# Patient Record
Sex: FEMALE | Race: OTHER | Employment: UNEMPLOYED | URBAN - METROPOLITAN AREA
[De-identification: names, ages, dates, MRNs, and addresses within clinical notes are randomized per-mention and may not be internally consistent; named-entity substitution may affect disease eponyms.]

---

## 2019-10-18 ENCOUNTER — HOSPITAL ENCOUNTER (OUTPATIENT)
Dept: LAB | Age: 29
Discharge: HOME | End: 2019-10-18
Payer: SUBSIDIZED

## 2019-10-18 ENCOUNTER — INITIAL PRENATAL (OUTPATIENT)
Dept: FAMILY MEDICINE CLINIC | Age: 29
End: 2019-10-18

## 2019-10-18 VITALS
WEIGHT: 158 LBS | TEMPERATURE: 97.4 F | DIASTOLIC BLOOD PRESSURE: 67 MMHG | SYSTOLIC BLOOD PRESSURE: 104 MMHG | HEART RATE: 72 BPM | RESPIRATION RATE: 16 BRPM | BODY MASS INDEX: 28 KG/M2 | HEIGHT: 63 IN | OXYGEN SATURATION: 99 %

## 2019-10-18 DIAGNOSIS — Z34.90 PREGNANCY, UNSPECIFIED GESTATIONAL AGE: Primary | ICD-10-CM

## 2019-10-18 LAB
BILIRUB UR QL STRIP: NEGATIVE
GLUCOSE UR-MCNC: NEGATIVE MG/DL
KETONES P FAST UR STRIP-MCNC: NEGATIVE MG/DL
PH UR STRIP: 5.5 [PH] (ref 4.6–8)
PROT UR QL STRIP: NEGATIVE
SP GR UR STRIP: 1.01 (ref 1–1.03)
UA UROBILINOGEN AMB POC: NORMAL (ref 0.2–1)
URINALYSIS CLARITY POC: CLEAR
URINALYSIS COLOR POC: YELLOW
URINE BLOOD POC: NORMAL
URINE LEUKOCYTES POC: NEGATIVE
URINE NITRITES POC: NEGATIVE

## 2019-10-18 PROCEDURE — 88175 CYTOPATH C/V AUTO FLUID REDO: CPT

## 2019-10-18 PROCEDURE — 87491 CHLMYD TRACH DNA AMP PROBE: CPT

## 2019-10-18 RX ORDER — PRENATAL VIT 96/IRON FUM/FOLIC 27MG-0.8MG
TABLET ORAL
Refills: 0 | COMMUNITY
Start: 2019-10-05 | End: 2021-02-27

## 2019-10-18 NOTE — PROGRESS NOTES
Chief Complaint   Patient presents with    Initial Prenatal Visit     10w5d        Leakage of Fluid: NO  Vaginal Bleeding: NO  Fetal Movement: Not yet  Prenatal vitamins: YES  Having Contractions: NO  Pain: NO    .  Visit Vitals  /67 (BP 1 Location: Left arm, BP Patient Position: Sitting)   Pulse 72   Temp 97.4 °F (36.3 °C) (Oral)   Resp 16   Ht 5' 2.6\" (1.59 m)   Wt 158 lb (71.7 kg)   LMP 2019 (Exact Date)   SpO2 99%   BMI 28.35 kg/m²

## 2019-10-18 NOTE — LETTER
NOTIFICATION 
 
10/18/2019 3:53 PM 
 
[facility]as [address] [facility] [id number] To Whom It May Concern: 
 
Mariano Lamas is currently under the care of [address]. 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 Sincerely, Shawna Andrew, DO

## 2019-10-18 NOTE — PROGRESS NOTES
History and Physical    Patient: Maria D Decker MRN: <[id number]>  SSN: [id number]    YOB: 1990  Age: 34 y.o. Sex: female      Subjective:      Maria D Decker is a 34 y.o. female G1 at 7w10d who presents for IOB visit. Not trying to get pregnant but happy about it. Was not on birth control. FOB involved, do not live together, [place] Pt lives with a friend. Does not work. Seen at [facility] for abd pain, dx with gallstones, has eliminated fat from diet and now without pain. Past Medical History:   Diagnosis Date    Depression     never on Rx, was depressed when her mother  9 yr ago    Gallstones      History reviewed. No pertinent surgical history. History reviewed. No pertinent family history. Social History     Tobacco Use    Smoking status: Never Smoker    Smokeless tobacco: Never Used   Substance Use Topics    Alcohol use: Not Currently      Prior to Admission medications    Medication Sig Start Date End Date Taking? Authorizing Provider   prenatal [id number] fum/folic (PRENATAL VITAMIN) 27 mg iron- 800 mcg tab tablet TK 1 T PO  D 10/5/19   Provider, Historical        No Known Allergies    Review of Systems:  ROS negative except as noted in HPI. Objective:     Vitals:    10/18/19 1526   BP: 104/67   Pulse: 72   Resp: 16   Temp: 97.4 °F (36.3 °C)   TempSrc: Oral   SpO2: 99%   Weight: 158 lb (71.7 kg)   Height: 5' 2.6\" (1.59 m)        Physical Exam:  See prenatal physical exam.    Assessment/Plan:   30yo G1 @ 10w5d by LMP=7wk scan at [facility] (scanned in media)  1. IUP: IOB labs today, s/p flu, PNV  2. Gallstones: on US at [facility] on , scanned in med, asymptomatic   3.   Hx depression: never on meds, no depression currently, would see for early PP depression screening     Signed By: Reyes Ackerman DO     2019

## 2019-10-20 LAB — BACTERIA UR CULT: NO GROWTH

## 2019-10-22 LAB
ABO GROUP BLD: NORMAL
BASOPHILS # BLD AUTO: 0 X10E3/UL (ref 0–0.2)
BASOPHILS NFR BLD AUTO: 0 %
BLD GP AB SCN SERPL QL: NEGATIVE
C TRACH RRNA SPEC QL NAA+PROBE: NEGATIVE
EOSINOPHIL # BLD AUTO: 0.1 X10E3/UL (ref 0–0.4)
EOSINOPHIL NFR BLD AUTO: 1 %
ERYTHROCYTE [DISTWIDTH] IN BLOOD BY AUTOMATED COUNT: 12.7 % (ref 12.3–15.4)
EST. AVERAGE GLUCOSE BLD GHB EST-MCNC: 91 MG/DL
HBA1C MFR BLD: 4.8 % (ref 4.8–5.6)
HBV SURFACE AG SERPL QL IA: NEGATIVE
HCT VFR BLD AUTO: 39.9 % (ref 34–46.6)
HGB A MFR BLD: 97.6 % (ref 96.4–98.8)
HGB A2 MFR BLD COLUMN CHROM: 2.4 % (ref 1.8–3.2)
HGB BLD-MCNC: 13.6 G/DL (ref 11.1–15.9)
HGB C MFR BLD: 0 %
HGB F MFR BLD: 0 % (ref 0–2)
HGB FRACT BLD-IMP: NORMAL
HGB OTHER MFR BLD HPLC: 0 %
HGB S BLD QL SOLY: NEGATIVE
HGB S MFR BLD: 0 %
HIV 1+2 AB+HIV1 P24 AG SERPL QL IA: NON REACTIVE
IMM GRANULOCYTES # BLD AUTO: 0 X10E3/UL (ref 0–0.1)
IMM GRANULOCYTES NFR BLD AUTO: 0 %
LYMPHOCYTES # BLD AUTO: 2.4 X10E3/UL (ref 0.7–3.1)
LYMPHOCYTES NFR BLD AUTO: 25 %
MCH RBC QN AUTO: 31.6 PG (ref 26.6–33)
MCHC RBC AUTO-ENTMCNC: 34.1 G/DL (ref 31.5–35.7)
MCV RBC AUTO: 93 FL (ref 79–97)
MONOCYTES # BLD AUTO: 0.6 X10E3/UL (ref 0.1–0.9)
MONOCYTES NFR BLD AUTO: 7 %
N GONORRHOEA RRNA SPEC QL NAA+PROBE: NEGATIVE
NEUTROPHILS # BLD AUTO: 6.5 X10E3/UL (ref 1.4–7)
NEUTROPHILS NFR BLD AUTO: 67 %
PLATELET # BLD AUTO: 150 X10E3/UL (ref 150–450)
RBC # BLD AUTO: 4.3 X10E6/UL (ref 3.77–5.28)
RH BLD: POSITIVE
RPR SER QL: NON REACTIVE
RUBV IGG SERPL IA-ACNC: <0.9 INDEX
SPECIMEN SOURCE: NORMAL
VZV IGG SER IA-ACNC: 864 INDEX
WBC # BLD AUTO: 9.6 X10E3/UL (ref 3.4–10.8)

## 2019-11-15 ENCOUNTER — ROUTINE PRENATAL (OUTPATIENT)
Dept: FAMILY MEDICINE CLINIC | Age: 29
End: 2019-11-15

## 2019-11-15 VITALS
HEIGHT: 63 IN | OXYGEN SATURATION: 99 % | BODY MASS INDEX: 29.41 KG/M2 | SYSTOLIC BLOOD PRESSURE: 112 MMHG | WEIGHT: 166 LBS | RESPIRATION RATE: 18 BRPM | TEMPERATURE: 97.8 F | DIASTOLIC BLOOD PRESSURE: 72 MMHG | HEART RATE: 73 BPM

## 2019-11-15 DIAGNOSIS — Z34.90 PRENATAL CARE, ANTEPARTUM: Primary | ICD-10-CM

## 2019-11-15 LAB
BILIRUB UR QL STRIP: NEGATIVE
GLUCOSE UR-MCNC: NEGATIVE MG/DL
KETONES P FAST UR STRIP-MCNC: NORMAL MG/DL
PH UR STRIP: 5.5 [PH] (ref 4.6–8)
PROT UR QL STRIP: NEGATIVE
SP GR UR STRIP: 1.03 (ref 1–1.03)
UA UROBILINOGEN AMB POC: NORMAL (ref 0.2–1)
URINALYSIS CLARITY POC: CLEAR
URINALYSIS COLOR POC: YELLOW
URINE BLOOD POC: NORMAL
URINE LEUKOCYTES POC: NORMAL
URINE NITRITES POC: NEGATIVE

## 2019-11-15 NOTE — PROGRESS NOTES
Chief Complaint   Patient presents with    Routine Prenatal Visit     14w 5 d     1. Have you been to the ER, urgent care clinic since your last visit? Hospitalized since your last visit? No    2. Have you seen or consulted any other health care providers outside of the [address] [place] [address] since your last visit? Include any pap smears or colon screening.  No          No abnormal bleeding or discharge

## 2019-11-15 NOTE — PROGRESS NOTES
Return OB Visit       Objective:   /72 (BP 1 Location: Right arm, BP Patient Position: Sitting)   Pulse 73   Temp 97.8 °F (36.6 °C) (Oral)   Resp 18   Ht 5' 2.6\" (1.59 m)   Wt 166 lb (75.3 kg)   LMP 08/04/2019 (Exact Date)   SpO2 99%   BMI 29.78 kg/m²     See flowsheet   Assessment       ICD-10-CM ICD-9-CM    1. Prenatal care, antepartum Z34.90 V22.1 AMB POC URINALYSIS DIP STICK AUTO W/O MICRO     Plan   30yo G1 @ 14w5d by LMP=7wk scan at [facility] (scanned in media)  1. IUP: RH pos, s/p flu, PNV, anatomy 12/23  2. Gallstones: on US at [facility] on 9/21, scanned in med, asymptomatic   3. Hx depression: never on meds, no depression currently, would see for early PP depression screening   4.   Rubella NI: offer vaccine PP     Orders Placed This Encounter    AMB POC URINALYSIS DIP STICK AUTO W/O MICRO         Shawna Andrew,

## 2019-12-13 ENCOUNTER — ROUTINE PRENATAL (OUTPATIENT)
Dept: FAMILY MEDICINE CLINIC | Age: 29
End: 2019-12-13

## 2019-12-13 VITALS
RESPIRATION RATE: 16 BRPM | BODY MASS INDEX: 30.65 KG/M2 | HEART RATE: 72 BPM | WEIGHT: 173 LBS | OXYGEN SATURATION: 98 % | SYSTOLIC BLOOD PRESSURE: 100 MMHG | TEMPERATURE: 97.5 F | HEIGHT: 63 IN | DIASTOLIC BLOOD PRESSURE: 64 MMHG

## 2019-12-13 DIAGNOSIS — Z34.90 PRENATAL CARE, ANTEPARTUM: Primary | ICD-10-CM

## 2019-12-13 LAB
BILIRUB UR QL STRIP: NEGATIVE
GLUCOSE UR-MCNC: NEGATIVE MG/DL
KETONES P FAST UR STRIP-MCNC: NEGATIVE MG/DL
PH UR STRIP: 6 [PH] (ref 4.6–8)
PROT UR QL STRIP: NEGATIVE
SP GR UR STRIP: 1.02 (ref 1–1.03)
UA UROBILINOGEN AMB POC: NORMAL (ref 0.2–1)
URINALYSIS CLARITY POC: CLEAR
URINALYSIS COLOR POC: YELLOW
URINE BLOOD POC: NEGATIVE
URINE LEUKOCYTES POC: NORMAL
URINE NITRITES POC: NEGATIVE

## 2019-12-13 NOTE — PROGRESS NOTES
Chief Complaint   Patient presents with    Routine Prenatal Visit     18w5d    Leakage of Fluid: NO  Vaginal Bleeding: NO  Fetal Movement: YES  Prenatal vitamins: YES  Having Contractions: NO  Pain: NO    Visit Vitals  Resp 16   Ht 5' 2.6\" (1.59 m)   Wt 173 lb (78.5 kg)   LMP 08/04/2019 (Exact Date)   BMI 31.04 kg/m²

## 2019-12-13 NOTE — PROGRESS NOTES
Return OB Visit   No concerns     Objective:   Resp 16   Ht 5' 2.6\" (1.59 m)   Wt 173 lb (78.5 kg)   LMP 08/04/2019 (Exact Date)   BMI 31.04 kg/m²     See flowsheet   Assessment       ICD-10-CM ICD-9-CM    1. Prenatal care, antepartum Z34.90 V22.1 AMB POC URINALYSIS DIP STICK AUTO W/O MICRO      CANCELED: CULTURE, GENITAL GROUP B STREP     Plan   30yo G1 @ 18w5d by LMP=7wk scan at [facility] (scanned in media)  1. IUP: RH pos, s/p flu, PNV, anatomy 12/23  2. Gallstones: on US at [facility] on 9/21, scanned in med, asymptomatic   3. Hx depression: never on meds, no depression currently, would see for early PP depression screening   4.   Rubella NI: offer vaccine PP     Orders Placed This Encounter    AMB POC URINALYSIS DIP STICK AUTO W/O MICRO         Shawna Andrew, DO

## 2019-12-23 ENCOUNTER — HOSPITAL ENCOUNTER (OUTPATIENT)
Dept: PERINATAL CARE | Age: 29
Discharge: HOME | End: 2019-12-23
Attending: OBSTETRICS & GYNECOLOGY
Payer: SUBSIDIZED

## 2019-12-23 PROCEDURE — 76805 OB US >/= 14 WKS SNGL FETUS: CPT | Performed by: OBSTETRICS & GYNECOLOGY

## 2020-01-10 ENCOUNTER — ROUTINE PRENATAL (OUTPATIENT)
Dept: FAMILY MEDICINE CLINIC | Age: 30
End: 2020-01-10

## 2020-01-10 VITALS
HEART RATE: 62 BPM | RESPIRATION RATE: 16 BRPM | OXYGEN SATURATION: 98 % | BODY MASS INDEX: 31.71 KG/M2 | WEIGHT: 179 LBS | DIASTOLIC BLOOD PRESSURE: 61 MMHG | TEMPERATURE: 97.9 F | HEIGHT: 63 IN | SYSTOLIC BLOOD PRESSURE: 95 MMHG

## 2020-01-10 DIAGNOSIS — Z34.90 PRENATAL CARE, ANTEPARTUM: Primary | ICD-10-CM

## 2020-01-10 LAB
BILIRUB UR QL STRIP: NEGATIVE
GLUCOSE UR-MCNC: NEGATIVE MG/DL
KETONES P FAST UR STRIP-MCNC: NEGATIVE MG/DL
PH UR STRIP: 6.5 [PH] (ref 4.6–8)
PROT UR QL STRIP: NEGATIVE
SP GR UR STRIP: 1.01 (ref 1–1.03)
UA UROBILINOGEN AMB POC: NORMAL (ref 0.2–1)
URINALYSIS CLARITY POC: CLEAR
URINALYSIS COLOR POC: YELLOW
URINE BLOOD POC: NEGATIVE
URINE LEUKOCYTES POC: NEGATIVE
URINE NITRITES POC: NEGATIVE

## 2020-01-10 NOTE — PROGRESS NOTES
Return OB Visit   No concerns     Objective:   BP 95/61 (BP 1 Location: Left arm, BP Patient Position: Sitting)   Pulse 62   Temp 97.9 °F (36.6 °C) (Oral)   Resp 16   Ht 5' 2.6\" (1.59 m)   Wt 179 lb (81.2 kg)   LMP 08/04/2019 (Exact Date)   SpO2 98%   BMI 32.11 kg/m²     See flowsheet   Assessment       ICD-10-CM ICD-9-CM    1. Prenatal care, antepartum Z34.90 V22.1 AMB POC URINALYSIS DIP STICK AUTO W/ MICRO     Plan   28yo G1 @ 22w5d by LMP=7wk scan at [facility] (scanned in media)  1. IUP: RH pos, s/p flu, PNV, anatomy normal   2. Gallstones: on US at [facility] on 9/21, scanned in media, asymptomatic   3. Hx depression: never on meds, no depression currently, would see for early PP depression screening   4.   Rubella NI: offer vaccine PP     Orders Placed This Encounter    AMB POC URINALYSIS DIP STICK AUTO W/ MICRO         Shawna Andrew,

## 2020-01-10 NOTE — PROGRESS NOTES
Chief Complaint   Patient presents with    Routine Prenatal Visit     22w5d    Leakage of Fluid: NO  Vaginal Bleeding: NO  Fetal Movement: YES  Prenatal vitamins: YES  Having Contractions: NO  Pain: NO    Visit Vitals  BP 95/61 (BP 1 Location: Left arm, BP Patient Position: Sitting)   Pulse 62   Temp 97.9 °F (36.6 °C) (Oral)   Resp 16   Ht 5' 2.6\" (1.59 m)   Wt 179 lb (81.2 kg)   LMP 08/04/2019 (Exact Date)   SpO2 98%   BMI 32.11 kg/m²

## 2020-02-07 ENCOUNTER — HOSPITAL ENCOUNTER (OUTPATIENT)
Dept: LAB | Age: 30
Discharge: HOME | End: 2020-02-07

## 2020-02-07 ENCOUNTER — ROUTINE PRENATAL (OUTPATIENT)
Dept: FAMILY MEDICINE CLINIC | Age: 30
End: 2020-02-07

## 2020-02-07 VITALS
SYSTOLIC BLOOD PRESSURE: 97 MMHG | BODY MASS INDEX: 33.13 KG/M2 | HEIGHT: 63 IN | DIASTOLIC BLOOD PRESSURE: 60 MMHG | HEART RATE: 74 BPM | RESPIRATION RATE: 14 BRPM | OXYGEN SATURATION: 97 % | WEIGHT: 187 LBS | TEMPERATURE: 97.7 F

## 2020-02-07 DIAGNOSIS — Z3A.26 26 WEEKS GESTATION OF PREGNANCY: ICD-10-CM

## 2020-02-07 DIAGNOSIS — Z3A.26 26 WEEKS GESTATION OF PREGNANCY: Primary | ICD-10-CM

## 2020-02-07 LAB
BILIRUB UR QL STRIP: NEGATIVE
ERYTHROCYTE [DISTWIDTH] IN BLOOD BY AUTOMATED COUNT: 12.5 % (ref 11.5–14.5)
GLUCOSE 1H P 100 G GLC PO SERPL-MCNC: 157 MG/DL (ref 65–140)
GLUCOSE UR-MCNC: NEGATIVE MG/DL
HCT VFR BLD AUTO: 36.1 % (ref 35–47)
HGB BLD-MCNC: 12.1 G/DL (ref 11.5–16)
KETONES P FAST UR STRIP-MCNC: NEGATIVE MG/DL
MCH RBC QN AUTO: 31.3 PG (ref 26–34)
MCHC RBC AUTO-ENTMCNC: 33.5 G/DL (ref 30–36.5)
MCV RBC AUTO: 93.3 FL (ref 80–99)
NRBC # BLD: 0 K/UL (ref 0–0.01)
NRBC BLD-RTO: 0 PER 100 WBC
PH UR STRIP: 7 [PH] (ref 4.6–8)
PLATELET # BLD AUTO: 127 K/UL (ref 150–400)
PROT UR QL STRIP: NEGATIVE
RBC # BLD AUTO: 3.87 M/UL (ref 3.8–5.2)
SP GR UR STRIP: 1.02 (ref 1–1.03)
UA UROBILINOGEN AMB POC: NORMAL (ref 0.2–1)
URINALYSIS CLARITY POC: CLEAR
URINALYSIS COLOR POC: YELLOW
URINE BLOOD POC: NEGATIVE
URINE LEUKOCYTES POC: NORMAL
URINE NITRITES POC: NEGATIVE
WBC # BLD AUTO: 9.2 K/UL (ref 3.6–11)

## 2020-02-07 NOTE — PROGRESS NOTES
Return OB Visit   No concerns. Baby less active at night than during the day but still moves at night. Objective:   BP 97/60   Pulse 74   Temp 97.7 °F (36.5 °C) (Oral)   Resp 14   Ht 5' 2.6\" (1.59 m)   Wt 187 lb (84.8 kg)   LMP 08/04/2019 (Exact Date)   SpO2 97%   BMI 33.55 kg/m²     See flowsheet   Assessment       ICD-10-CM ICD-9-CM    1. 26 weeks gestation of pregnancy Z3A.26 V22.2 GLUCOSE, GESTATIONAL 1 HR TOLERANCE      CBC W/O DIFF      AMB POC URINALYSIS DIP STICK AUTO W/O MICRO     Plan   28yo G1 @ 26w5d by LMP=7wk scan at [facility] (scanned in media)  1. IUP: RH pos, s/p flu, PNV, anatomy normal, CBC+GTT today. Kick counts discussed. 2.  Gallstones: on US at [facility] on 9/21, scanned in media, asymptomatic   3. Hx depression: never on meds, no depression currently, would see for early PP depression screening   4.   Rubella NI: offer vaccine PP     Orders Placed This Encounter    GLUCOSE, GESTATIONAL 1 HR TOLERANCE     Standing Status:   Future     Standing Expiration Date:   2/7/2021    CBC W/O DIFF     Standing Status:   Future     Standing Expiration Date:   2/7/2021    AMB POC URINALYSIS DIP STICK AUTO W/O MICRO         Shawna Andrew DO

## 2020-02-07 NOTE — PROGRESS NOTES
Chief Complaint   Patient presents with    Routine Prenatal Visit     26w5d    Leakage of Fluid: NO  Vaginal Bleeding: NO  Fetal Movement: YES  Prenatal vitamins: YES  Having Contractions: NO  Pain: NO    Visit Vitals  Ht 5' 2.6\" (1.59 m)   LMP 08/04/2019 (Exact Date)   BMI 32.11 kg/m²

## 2020-02-11 ENCOUNTER — TELEPHONE (OUTPATIENT)
Dept: FAMILY MEDICINE CLINIC | Age: 30
End: 2020-02-11

## 2020-02-11 NOTE — TELEPHONE ENCOUNTER
Called patient using [employer]  and left voicemail for patient to call back. Patient needs to return for 3 hour fasting GTT.

## 2020-02-28 ENCOUNTER — ROUTINE PRENATAL (OUTPATIENT)
Dept: FAMILY MEDICINE CLINIC | Age: 30
End: 2020-02-28

## 2020-02-28 VITALS
HEIGHT: 63 IN | TEMPERATURE: 97.6 F | DIASTOLIC BLOOD PRESSURE: 69 MMHG | RESPIRATION RATE: 16 BRPM | SYSTOLIC BLOOD PRESSURE: 104 MMHG | OXYGEN SATURATION: 98 % | HEART RATE: 82 BPM | BODY MASS INDEX: 33.55 KG/M2

## 2020-02-28 DIAGNOSIS — O09.899 RUBELLA NON-IMMUNE STATUS, ANTEPARTUM: ICD-10-CM

## 2020-02-28 DIAGNOSIS — Z28.39 RUBELLA NON-IMMUNE STATUS, ANTEPARTUM: ICD-10-CM

## 2020-02-28 DIAGNOSIS — D69.6 THROMBOCYTOPENIA AFFECTING PREGNANCY (HCC): ICD-10-CM

## 2020-02-28 DIAGNOSIS — Z34.90 PRENATAL CARE, ANTEPARTUM: Primary | ICD-10-CM

## 2020-02-28 DIAGNOSIS — K80.20 GALLSTONES: ICD-10-CM

## 2020-02-28 DIAGNOSIS — O99.119 THROMBOCYTOPENIA AFFECTING PREGNANCY (HCC): ICD-10-CM

## 2020-02-28 DIAGNOSIS — Z86.59 HISTORY OF DEPRESSION: ICD-10-CM

## 2020-02-28 DIAGNOSIS — O99.210 OBESITY IN PREGNANCY: ICD-10-CM

## 2020-02-28 NOTE — PROGRESS NOTES
Return OB Visit   No concerns, baby active. Objective:   /69 (BP 1 Location: Left arm, BP Patient Position: Sitting)   Pulse 82   Temp 97.6 °F (36.4 °C) (Oral)   Resp 16   Ht 5' 2.6\" (1.59 m)   LMP 08/04/2019 (Exact Date)   SpO2 98%   BMI 33.55 kg/m²     See flowsheet   Assessment       ICD-10-CM ICD-9-CM    1. Prenatal care, antepartum Z34.90 V22.1 AMB POC URINALYSIS DIP STICK AUTO W/O MICRO      TETANUS, DIPHTHERIA TOXOIDS AND ACELLULAR PERTUSSIS VACCINE (TDAP), IN INDIVIDS. >=7, IM      AK IMMUNIZ ADMIN,1 SINGLE/COMB VAC/TOXOID      PLATELET COUNT      GLUCOSE, GESTATIONAL, 3 HR TOLERANCE   2. Obesity in pregnancy O99.210 649.10    3. Gallstones K80.20 574.20    4. History of depression Z86.59 V11.8    5. Rubella non-immune status, antepartum O99.89 646.83     Z28.3 V15.83    6. Thrombocytopenia affecting pregnancy ([facility] [address] O99.119 649.30     D69.6 287.5      Plan   28yo G1 @ 29w5d by LMP=7wk scan at [facility] (scanned in media)  1. IUP: RH pos, s/p flu, PNV, anatomy normal, failed 1' and couldn't get in touch to schedule 3' - is now scheduled for next week. S/p tdap. 2.  Gallstones: on US at [facility] on 9/21, scanned in media, asymptomatic   3. Hx depression: never on meds, no depression currently, would see for early PP depression screening   4. Rubella NI: offer vaccine PP   5. Thrombocytopenia: mild, likely gestational.  Rechecking with 3'.     6.  Obesity    Orders Placed This Encounter    AK IMMUNIZ ADMIN,1 SINGLE/COMB VAC/TOXOID    TETANUS, DIPHTHERIA TOXOIDS AND ACELLULAR PERTUSSIS VACCINE (TDAP), IN INDIVIDS. >=7, IM    PLATELET COUNT     Standing Status:   Future     Standing Expiration Date:   2/28/2021    GLUCOSE, GESTATIONAL, 3 HR TOLERANCE     Standing Status:   Future     Standing Expiration Date:   2/28/2021    AMB POC URINALYSIS DIP STICK AUTO W/O MICRO         Marcee C Vest, DO

## 2020-03-03 ENCOUNTER — HOSPITAL ENCOUNTER (OUTPATIENT)
Dept: LAB | Age: 30
Discharge: HOME | End: 2020-03-03

## 2020-03-03 ENCOUNTER — LAB ONLY (OUTPATIENT)
Dept: FAMILY MEDICINE CLINIC | Age: 30
End: 2020-03-03

## 2020-03-03 DIAGNOSIS — Z34.90 PRENATAL CARE, ANTEPARTUM: ICD-10-CM

## 2020-03-03 LAB
COMMENT, HOLDF: NORMAL
GESTATIONAL 3HR GTT,GESTA: NORMAL
GLUCOSE 1H P 100 G GLC PO SERPL-MCNC: 118 MG/DL (ref 65–180)
GLUCOSE P FAST SERPL-MCNC: 76 MG/DL (ref 65–95)
GLUCOSE, 2 HR,GSTT2: 149 MG/DL (ref 65–155)
GLUCOSE, 3 HR,GSTT3: 117 MG/DL (ref 65–140)
PLATELET # BLD AUTO: NORMAL K/UL (ref 150–400)
SAMPLES BEING HELD,HOLD: NORMAL

## 2020-03-04 ENCOUNTER — TELEPHONE (OUTPATIENT)
Dept: FAMILY MEDICINE CLINIC | Age: 30
End: 2020-03-04

## 2020-03-04 DIAGNOSIS — O99.119 THROMBOCYTOPENIA AFFECTING PREGNANCY (HCC): Primary | ICD-10-CM

## 2020-03-04 DIAGNOSIS — D69.6 THROMBOCYTOPENIA AFFECTING PREGNANCY (HCC): Primary | ICD-10-CM

## 2020-03-04 NOTE — TELEPHONE ENCOUNTER
Left voicemail for patient with [employer]  [phone]. Patient needs lab appointment to recheck platelet count. Lab ordered.

## 2020-03-04 NOTE — TELEPHONE ENCOUNTER
Lab error for platelet count. Per verbal order from Dr. Ana Rosa Schwartz to order platelet count citrated plasma.

## 2020-03-13 ENCOUNTER — ROUTINE PRENATAL (OUTPATIENT)
Dept: FAMILY MEDICINE CLINIC | Age: 30
End: 2020-03-13

## 2020-03-13 ENCOUNTER — HOSPITAL ENCOUNTER (OUTPATIENT)
Dept: LAB | Age: 30
Discharge: HOME | End: 2020-03-13

## 2020-03-13 VITALS
OXYGEN SATURATION: 97 % | RESPIRATION RATE: 16 BRPM | HEIGHT: 63 IN | HEART RATE: 71 BPM | SYSTOLIC BLOOD PRESSURE: 102 MMHG | WEIGHT: 196 LBS | DIASTOLIC BLOOD PRESSURE: 63 MMHG | TEMPERATURE: 97.3 F | BODY MASS INDEX: 34.73 KG/M2

## 2020-03-13 DIAGNOSIS — O99.119 THROMBOCYTOPENIA AFFECTING PREGNANCY (HCC): ICD-10-CM

## 2020-03-13 DIAGNOSIS — Z86.59 HISTORY OF DEPRESSION: ICD-10-CM

## 2020-03-13 DIAGNOSIS — D69.6 THROMBOCYTOPENIA AFFECTING PREGNANCY (HCC): ICD-10-CM

## 2020-03-13 DIAGNOSIS — Z34.90 PRENATAL CARE, ANTEPARTUM: Primary | ICD-10-CM

## 2020-03-13 DIAGNOSIS — O99.210 OBESITY IN PREGNANCY: ICD-10-CM

## 2020-03-13 DIAGNOSIS — O09.899 RUBELLA NON-IMMUNE STATUS, ANTEPARTUM: ICD-10-CM

## 2020-03-13 DIAGNOSIS — Z28.39 RUBELLA NON-IMMUNE STATUS, ANTEPARTUM: ICD-10-CM

## 2020-03-13 DIAGNOSIS — K80.20 GALLSTONES: ICD-10-CM

## 2020-03-13 LAB
BILIRUB UR QL STRIP: NEGATIVE
GLUCOSE UR-MCNC: NEGATIVE MG/DL
KETONES P FAST UR STRIP-MCNC: NEGATIVE MG/DL
PH UR STRIP: 5.5 [PH] (ref 4.6–8)
PROT UR QL STRIP: NEGATIVE
SP GR UR STRIP: 1.02 (ref 1–1.03)
UA UROBILINOGEN AMB POC: NORMAL (ref 0.2–1)
URINALYSIS CLARITY POC: NORMAL
URINALYSIS COLOR POC: YELLOW
URINE BLOOD POC: NEGATIVE
URINE LEUKOCYTES POC: NEGATIVE
URINE NITRITES POC: NEGATIVE

## 2020-03-13 NOTE — PROGRESS NOTES
Return OB Visit   No concerns, baby active. Objective:   /63 (BP 1 Location: Left arm, BP Patient Position: Sitting)   Pulse 71   Temp 97.3 °F (36.3 °C) (Oral)   Resp 16   Ht 5' 2.6\" (1.59 m)   Wt 196 lb (88.9 kg)   LMP 08/04/2019 (Exact Date)   SpO2 97%   BMI 35.16 kg/m²     See flowsheet   Assessment       ICD-10-CM ICD-9-CM    1. Prenatal care, antepartum Z34.90 V22.1 AMB POC URINALYSIS DIP STICK AUTO W/O MICRO   2. Thrombocytopenia affecting pregnancy (HCC) O99.119 649.30     D69.6 287.5    3. Gallstones K80.20 574.20    4. Obesity in pregnancy O99.210 649.10    5. Rubella non-immune status, antepartum O99.89 646.83     Z28.3 V15.83    6. History of depression Z86.59 V11.8      Plan   30yo G1 @ 31w5d by LMP=7wk scan at [facility] (scanned in media)  1. IUP: RH pos, s/p flu, PNV, anatomy normal, passed 3' GTT, S/p tdap. 2.  Gallstones: on US at [facility] on 9/21, scanned in media, asymptomatic   3. Hx depression: never on meds, no depression currently, would see for early PP depression screening   4. Rubella NI: offer vaccine PP   5. Thrombocytopenia: mild, likely gestational.  Rechecking as there was an error on last recheck.     6.  Obesity: will plan to get growth scan ~ 34-36 wk     Orders Placed This Encounter    AMB POC URINALYSIS DIP STICK AUTO W/O MICRO         Shawna Andrew,

## 2020-03-14 LAB — PLATELET # BLD AUTO: 196 K/UL (ref 150–400)

## 2020-03-19 NOTE — PROGRESS NOTES
Platelets normal on citrated tube. Should collected in non-ETDA tube going forward for accurate measurement.

## 2020-03-23 ENCOUNTER — ROUTINE PRENATAL (OUTPATIENT)
Dept: FAMILY MEDICINE CLINIC | Age: 30
End: 2020-03-23

## 2020-03-23 VITALS
OXYGEN SATURATION: 97 % | WEIGHT: 199 LBS | HEIGHT: 63 IN | SYSTOLIC BLOOD PRESSURE: 107 MMHG | RESPIRATION RATE: 16 BRPM | DIASTOLIC BLOOD PRESSURE: 66 MMHG | HEART RATE: 78 BPM | BODY MASS INDEX: 35.26 KG/M2 | TEMPERATURE: 98.6 F

## 2020-03-23 DIAGNOSIS — Z34.90 PRENATAL CARE, ANTEPARTUM: Primary | ICD-10-CM

## 2020-03-23 DIAGNOSIS — Z28.39 RUBELLA NON-IMMUNE STATUS, ANTEPARTUM: ICD-10-CM

## 2020-03-23 DIAGNOSIS — O99.210 OBESITY IN PREGNANCY: ICD-10-CM

## 2020-03-23 DIAGNOSIS — O09.899 RUBELLA NON-IMMUNE STATUS, ANTEPARTUM: ICD-10-CM

## 2020-03-23 LAB
BILIRUB UR QL STRIP: NEGATIVE
GLUCOSE UR-MCNC: NEGATIVE MG/DL
KETONES P FAST UR STRIP-MCNC: NEGATIVE MG/DL
PH UR STRIP: 5.5 [PH] (ref 4.6–8)
PROT UR QL STRIP: NEGATIVE
SP GR UR STRIP: 1.02 (ref 1–1.03)
UA UROBILINOGEN AMB POC: NORMAL (ref 0.2–1)
URINALYSIS CLARITY POC: NORMAL
URINALYSIS COLOR POC: YELLOW
URINE BLOOD POC: NEGATIVE
URINE LEUKOCYTES POC: NORMAL
URINE NITRITES POC: NEGATIVE

## 2020-03-23 NOTE — PROGRESS NOTES
Chief Complaint   Patient presents with    Routine Prenatal Visit     33w1d    Leakage of Fluid: NO  Vaginal Bleeding: NO  Fetal Movement: YES  Prenatal vitamins: YES  Having Contractions: NO  Pain: NO    Visit Vitals  /66   Pulse 78   Temp 98.6 °F (37 °C) (Oral)   Resp 16   Ht 5' 2.6\" (1.59 m)   Wt 199 lb (90.3 kg)   LMP 08/04/2019 (Exact Date)   SpO2 97%   BMI 35.70 kg/m²       Immunization History   Administered Date(s) Administered    Influenza Vaccine (Quad) PF 10/18/2019    Tdap 02/28/2020

## 2020-03-23 NOTE — PROGRESS NOTES
Return OB Visit   No concerns, baby active. Objective:   /66   Pulse 78   Temp 98.6 °F (37 °C) (Oral)   Resp 16   Ht 5' 2.6\" (1.59 m)   Wt 199 lb (90.3 kg)   LMP 08/04/2019 (Exact Date)   SpO2 97%   BMI 35.70 kg/m²     See flowsheet   Assessment       ICD-10-CM ICD-9-CM    1. Prenatal care, antepartum Z34.90 V22.1 AMB POC URINALYSIS DIP STICK AUTO W/O MICRO   2. Obesity in pregnancy O99.210 649.10    3. Rubella non-immune status, antepartum O99.89 646.83     Z28.3 V15.83      Plan   28yo G1 @ 33w1d by LMP=7wk scan at [facility] (scanned in media)  1. IUP: RH pos, s/p flu, PNV, anatomy normal, passed 3' GTT, S/p tdap. 2.  Gallstones: on US at [facility] on 9/21, scanned in media, asymptomatic   3. Hx depression: never on meds, no depression currently, would see for early PP depression screening   4. Rubella NI: offer vaccine PP   5. Thrombocytopenia: normal on citrated tube   6.   Obesity: f/up growth scheduled    Orders Placed This Encounter    AMB POC URINALYSIS DIP STICK AUTO W/O MICRO         Shawna Andrew,

## 2020-04-06 ENCOUNTER — ROUTINE PRENATAL (OUTPATIENT)
Dept: FAMILY MEDICINE CLINIC | Age: 30
End: 2020-04-06

## 2020-04-06 VITALS
HEART RATE: 84 BPM | RESPIRATION RATE: 18 BRPM | WEIGHT: 200.6 LBS | HEIGHT: 63 IN | BODY MASS INDEX: 35.54 KG/M2 | DIASTOLIC BLOOD PRESSURE: 69 MMHG | TEMPERATURE: 98.6 F | OXYGEN SATURATION: 98 % | SYSTOLIC BLOOD PRESSURE: 102 MMHG

## 2020-04-06 DIAGNOSIS — Z3A.35 35 WEEKS GESTATION OF PREGNANCY: Primary | ICD-10-CM

## 2020-04-06 LAB
BILIRUB UR QL STRIP: NEGATIVE
GLUCOSE UR-MCNC: NEGATIVE MG/DL
KETONES P FAST UR STRIP-MCNC: NEGATIVE MG/DL
PH UR STRIP: 5.5 [PH] (ref 4.6–8)
PROT UR QL STRIP: NEGATIVE
SP GR UR STRIP: 1.02 (ref 1–1.03)
UA UROBILINOGEN AMB POC: NORMAL (ref 0.2–1)
URINALYSIS CLARITY POC: CLEAR
URINALYSIS COLOR POC: YELLOW
URINE BLOOD POC: NEGATIVE
URINE LEUKOCYTES POC: NORMAL
URINE NITRITES POC: NEGATIVE

## 2020-04-06 NOTE — PATIENT INSTRUCTIONS
Semanas 34 a 36 de alfaro embarazo: Instrucciones de cuidado  Weeks 34 to 36 of Your Pregnancy: Care Instructions  Instrucciones de cuidado    A estas [place], alfaro bebé y alfaro abdomen habrán crecido considerablemente. Kia es [place] de ally a guzman. Los pulmones de alfaro bebé están kia listos para respirar aire. Los huesos de la mariana de alfaro bebé ahora son bastante firmes meg para protegerla rachel se mantienen lo suficientemente blandos meg para atravesar el canal de [place]. Es posible que sienta entusiasmo, carlos, ansiedad o miedo. Quizá se pregunte cómo se dará cuenta de si está en trabajo de parto o qué esperar en maryam momento. Trate de ser flexible con chandrakant expectativas respecto del nacimiento. Dado que cada nacimiento es diferente, no hay manera de saber exactamente cómo será alfaro parto. Esta hoja de cuidados la ayudará a saber qué esperar y cómo prepararse. Le podría facilitar el parto. Si todavía no le olivares aplicado la vacuna Tdap (tétanos, difteria y tos [place]) raven hi [place], hable con alfaro médico acerca de aplicársela. Carie Hakim a proteger a alfaro recién nacido contra la infección por tos ferina. En la semana 36, a la mayoría de las mujeres se les hace khari prueba de estreptococos del donita B (GBS, por chandrakant siglas en inglés). Los estreptococos del donita B son bacterias comunes que pueden vivir en la vagina y el recto. Pueden hacer que alfaro bebé se enferme después del parto. Si el resultado es positivo, usted recibirá antibióticos raven el trabajo de [place]. Los medicamentos evitarán que alfaro bebé contraiga las bacterias. La atención de seguimiento es khari parte clave de alfaro tratamiento y seguridad. Asegúrese de hacer y acudir a todas las citas, y llame a alfaro médico si está teniendo problemas. También es khari buena idea saber los resultados de chandrakant exámenes y mantener khari lista de los medicamentos que pee. ¿Cómo puede cuidarse en el hogar?   Aprenda sobre las alternativas para aliviar el dolor  · El dolor se [place] de modo diferente en cada sissy. Hable con alfaro médico acerca de chandrakant sentimientos sobre el dolor. · Puede elegir entre varias formas de aliviar el dolor. Estas incluyen medicamentos o técnicas de respiración, así meg medidas para estar cómoda. Usted puede utilizar más de Sandre Leon opción. · Si elige un analgésico (medicamento para el dolor) raven el trabajo de [place], hable con alfaro médico acerca de chandrakant opciones. Algunos medicamentos reducen la ansiedad y [facility]i [facility] a aliviar parte del dolor. Otros adormecen la parte inferior del cuerpo para que no sienta dolor. · Asegúrese de decirle a alfaro médico acerca de alfaro elección de analgésico antes de empezar el trabajo de parto o muy temprano en el [place]. Es posible que pueda cambiar de parecer a medida que avanza el [place]. · Lizzie vez se duerme a khari sissy con medicamentos administrados a través de khari máscara o por vía intravenosa (IV). Trabajo de parto y [place]  · La primera etapa del [place] de parto se divide en arlene fases: [place] y de transición. ? La mayoría de las mujeres experimentan la fase latente del [place] parto en chandrakant hogares. Usted puede [employer] o descansar, comer refrigerios livianos, beber líquidos channing y comenzar a contar las contracciones. ? Cuando advierta que se le vuelve difícil hablar raven khari contracción, es posible que esté por pasar a la fase activa. Raven la fase Emaline Simmonds, debería ir al hospital si no está allí aún. ? Usted está en la fase activa cuando tiene contracciones cada 3 o 4 minutos y  alrededor de 60 segundos. El dominik uterino comienza a abrirse con más rapidez.  ? Si se le rompe la reyna, las contracciones serán más intensas y más frecuentes. ? Raven la fase de transición, el dominik uterino se estira y las contracciones se producen con Louise Zaki. ? Shawna Moody tenga deseos de pujar, sin embargo es posible que el dominik uterino aún no esté preparado.  El [employer] dirá cuándo pujar.  · La segunda etapa comienza cuando el dominik uterino se abre por completo y usted está lista para pujar. ? Las contracciones son muy intensas a fin de empujar al bebé por el canal de parto. ? Sentirá la necesidad de pujar. Podría sentir meg si tuviera ganas de evacuar el intestino. ? Bertrum Jewel entrenen a [employer]. Estas contracciones serán muy intensas rachel no ocurrirán con tanta frecuencia. Puede descansar un poco entre contracciones. ? Es posible que esté sensible e irritable. Es posible que no se dé cuenta de lo que pasa a alfaro alrededor. ? Un último esfuerzo y habrá nacido alfaro bebé. · La tercera etapa ocurre cuando con unas cuantas contracciones más se expulsa la placenta. [place] puede durar 30 minutos o menos. · La cuarta etapa es la de recuperación. Es posible que se sienta abrumada con las emociones y exhausta rachel alerta. Karon es un buen momento para comenzar el amamantamiento. ¿Dónde puede encontrar más información en inglés? Vaya a [email or web address]://alessia-leigha.info/  Jose David [id number] en la búsqueda para aprender más acerca de \"Semanas 34 a 39 de alfaro embarazo: Instrucciones de cuidado. \"  Revisado: [address] [place], 2019Versión del contenido: 12.4  © 1043-0887 36[address] [employer]. Las instrucciones de cuidado fueron adaptadas bajo licencia por [employer] (which disclaims liability or warranty for this information). Si usted tiene [place] afección médica o sobre estas instrucciones, siempre pregunte a alfaro profesional de yvette. [employer] niega toda garantía o responsabilidad por alfaro uso de esta información.

## 2020-04-06 NOTE — PROGRESS NOTES
Return OB Visit       Subjective:   Jonah Sifuentes 34 y.o.   MARCELL: 5/10/2020, by Last Menstrual Period  GA:  35w1d. [place] : [id number]    LOF: none  Vaginal bleeding: none  Fetal movement: present  Contractions: none  PNV: taking    Pt c/o 4 day hx of suprapubic pain; no dysuria/urgency. No nausea/vomiting. No fevers/chills. Allergies- reviewed:   No Known Allergies  Medications- reviewed:   Current Outpatient Medications   Medication Sig    prenatal VRMN84/PFKX fum/folic (PRENATAL VITAMIN) 27 mg iron- 800 mcg tab tablet TK 1 T PO  D     No current facility-administered medications for this visit. Past Medical History- reviewed:  Past Medical History:   Diagnosis Date    Depression     never on Rx, was depressed when her mother  9 yr ago    Gallstones      Past Surgical History- reviewed:   No past surgical history on file.   Social History- reviewed:  Social History     Socioeconomic History    Marital status: UNKNOWN     Spouse name: Not on file    Number of children: Not on file    Years of education: Not on file    Highest education level: Not on file   Occupational History    Not on file   Social Needs    Financial resource strain: Not on file    Food insecurity     Worry: Not on file     Inability: Not on file   [employer] needs     Medical: Not on file     Non-medical: Not on file   Tobacco Use    Smoking status: Never Smoker    Smokeless tobacco: Never Used   Substance and Sexual Activity    Alcohol use: Not Currently    Drug use: Never    Sexual activity: Not Currently   Lifestyle    Physical activity     Days per week: Not on file     Minutes per session: Not on file    Stress: Not on file   Relationships    Social connections     Talks on phone: Not on file     Gets together: Not on file     Attends Hoahaoism service: Not on file     Active member of club or organization: Not on file     Attends meetings of clubs or organizations: Not on file     Relationship status: Not on file    Intimate partner violence     Fear of current or ex partner: Not on file     Emotionally abused: Not on file     Physically abused: Not on file     Forced sexual activity: Not on file   Other Topics Concern    Not on file   Social History Narrative    Not on file     Immunizations- reviewed:   Immunization History   Administered Date(s) Administered    Influenza Vaccine (Quad) PF 10/18/2019    Tdap 2020       Objective:     Visit Vitals  /69 (BP 1 Location: Left arm, BP Patient Position: Sitting)   Pulse 84   Temp 98.6 °F (37 °C) (Oral)   Resp 18   Ht 5' 2.6\" (1.59 m)   Wt 200 lb 9.6 oz (91 kg)   LMP 2019 (Exact Date)   SpO2 98%   BMI 35.99 kg/m²       Physical Exam:  GENERAL APPEARANCE: alert, well appearing, in no apparent distress  ABDOMEN: gravid, fundal height 35 cm, FHT present at 155 bpm  PSYCH: normal mood and affect    Labs  Recent Results (from the past 12 hour(s))   AMB POC URINALYSIS DIP STICK AUTO W/O MICRO    Collection Time: 20 10:02 AM   Result Value Ref Range    Color (UA POC) Yellow     Clarity (UA POC) Clear     Glucose (UA POC) Negative Negative    Bilirubin (UA POC) Negative Negative    Ketones (UA POC) Negative Negative    Specific gravity (UA POC) 1.025 1.001 - 1.035    Blood (UA POC) Negative Negative    pH (UA POC) 5.5 4.6 - 8.0    Protein (UA POC) Negative Negative    Urobilinogen (UA POC) 0.2 mg/dL 0.2 - 1    Nitrites (UA POC) Negative Negative    Leukocyte esterase (UA POC) Trace Negative       Assessment         ICD-10-CM ICD-9-CM    1. 35 weeks gestation of pregnancy Z3A.35 V22.2 AMB POC URINALYSIS DIP STICK AUTO W/O MICRO         Plan   34 y.o.  35w1d MARCELL 5/10/2020, by Last Menstrual Period here for return OB visit     PNL: O+, Ab screen neg, CBC + hgb fract wnl. Rubella non-immune. VZV immune. HepBsAg, HIV, RPR neg. GC/C neg. Pap NILM. 1hr GTT elevated; 3hr GTT wnl, HgbA1c 4.8.  S/p Tdap.     SIUP in 3rd Trimester  Pregnancy complicated by maternal obesity, rubella non-immune, cholelithiasis, hx depression  · Maternal Obesity: BMI >35  · Growth scan 4/13  · Cholelithiasis: gallstones seen on US at [facility] 9/21 (scanned in media). Asx  · Hx Depression: no current sx. Never been on meds for this  · Will require early post-partum depression screening  · Rubella Non-Immune:   · Will require post-partum immunization  · Thrombocytopenia: Resolved. Plt 127 on 2/7; wnl on repeat 3/13 in citrate tube (plt 196)  · Follow-up in 2wks; plan for GBS screening + confirm cephalic presentation    Orders Placed This Encounter    AMB POC URINALYSIS DIP STICK AUTO W/O MICRO     Labor precautions discussed, including: Regular painful contractions, lasting for greater than one hour, taking your breath away; any vaginal bleeding; any leakage of fluid; or absent or decreased fetal movement. Call M.D. on call if any of these symptoms or signs occur. I have discussed the diagnosis with the patient and the intended plan as seen in the above orders. The patient has received an after-visit summary and questions were answered concerning future plans. I have discussed medication side effects and warnings with the patient as well. Informed pt to return to the office or go to the ER if she experiences vaginal bleeding, vaginal discharge, leaking of fluid, pelvic cramping.     Pt discussed with Dr. Fany Barba (attending physician)    Rodolfo Subramanian MD  Family Medicine Resident

## 2020-04-06 NOTE — PROGRESS NOTES
I reviewed with the resident the medical history and the resident's findings on the physical examination. I discussed with the resident the patient's diagnosis and concur with the plan. 28yo G1 @ 35w1d by LMP=7wk scan at [facility] (scanned in media)  1. IUP: RH pos, s/p flu, PNV, anatomy normal, passed 3' GTT, S/p tdap. 2.  Gallstones: on US at [facility] on 9/21, scanned in media, asymptomatic   3. Hx depression: never on meds, no depression currently, would see for early PP depression screening   4. Rubella NI: offer vaccine PP   5. Thrombocytopenia: normal on citrated tube   6.   Obesity: f/up growth scheduled

## 2020-04-13 ENCOUNTER — HOSPITAL ENCOUNTER (OUTPATIENT)
Dept: PERINATAL CARE | Age: 30
Discharge: HOME | End: 2020-04-13
Attending: OBSTETRICS & GYNECOLOGY
Payer: SUBSIDIZED

## 2020-04-13 PROCEDURE — 76816 OB US FOLLOW-UP PER FETUS: CPT | Performed by: OBSTETRICS & GYNECOLOGY

## 2020-04-20 ENCOUNTER — ROUTINE PRENATAL (OUTPATIENT)
Dept: FAMILY MEDICINE CLINIC | Age: 30
End: 2020-04-20

## 2020-04-20 ENCOUNTER — HOSPITAL ENCOUNTER (OUTPATIENT)
Dept: LAB | Age: 30
Discharge: HOME | End: 2020-04-20

## 2020-04-20 VITALS
HEIGHT: 63 IN | DIASTOLIC BLOOD PRESSURE: 65 MMHG | BODY MASS INDEX: 36.32 KG/M2 | WEIGHT: 205 LBS | OXYGEN SATURATION: 98 % | TEMPERATURE: 97.7 F | SYSTOLIC BLOOD PRESSURE: 100 MMHG | HEART RATE: 86 BPM | RESPIRATION RATE: 16 BRPM

## 2020-04-20 DIAGNOSIS — Z34.93 PRENATAL CARE IN THIRD TRIMESTER: ICD-10-CM

## 2020-04-20 DIAGNOSIS — Z34.93 PRENATAL CARE IN THIRD TRIMESTER: Primary | ICD-10-CM

## 2020-04-20 LAB
BILIRUB UR QL STRIP: NEGATIVE
GLUCOSE UR-MCNC: NEGATIVE MG/DL
KETONES P FAST UR STRIP-MCNC: NEGATIVE MG/DL
PH UR STRIP: 6 [PH] (ref 4.6–8)
PROT UR QL STRIP: NEGATIVE
SP GR UR STRIP: 1.01 (ref 1–1.03)
UA UROBILINOGEN AMB POC: NORMAL (ref 0.2–1)
URINALYSIS CLARITY POC: CLEAR
URINALYSIS COLOR POC: YELLOW
URINE BLOOD POC: NEGATIVE
URINE LEUKOCYTES POC: NEGATIVE
URINE NITRITES POC: NEGATIVE

## 2020-04-20 NOTE — PATIENT INSTRUCTIONS
Semana 40 de alfaro embarazo: Instrucciones de cuidado  Week 37 of Your Pregnancy: Care Instructions  Instrucciones de cuidado    Usted está cerca del final de alfaro embarazo, y probablemente esté bastante incómoda. Puede ser más difícil caminar. Acostarse probablemente tampoco sea cómodo. Podría tener dificultad para dormir o para permanecer dormida. La mayoría de las mujeres jumana a guzman entre las 40 y 43 semanas. Karon es un buen momento para pensar en preparar un maletín para el hospital con los artículos que necesitará. Entonces estará lista para cuando comience el [place] nat[place]. La atención de seguimiento es khari parte clave de alfaro tratamiento y seguridad. Asegúrese de hacer y acudir a todas las citas, y llame a alfaro médico si está teniendo problemas. También es khari buena idea saber los resultados de chandrakant exámenes y mantener khari lista de los medicamentos que pee. ¿Cómo puede cuidarse en el hogar? Aprenda sobre el amamantamiento  · El amamantamiento es lo mejor para alfaro bebé y Mordecai Ferraris es jeffers para usted. · La leche materna tiene anticuerpos que ayudan al bebé a combatir las infecciones. · Las madres que amamantan suelen bajar de peso más rápidamente, debido a que elaborar leche quema calorías. · Informarse acerca de las mejores maneras de sostener a alfaro bebé le facilitará el amamantamiento. · Deje que alfaro charley bañe y [employer] pañales del bebé para que no se sienta excluida. Acurrúquense juntos cuando amamante a alfaro bebé. · Es posible que desee aprender a usar un sacaleches y guardar alfaro [employer]. · Si elige alimentar a alfaro bebé con biberón, hágalo de la [place] [employer] resulte más parecida al amamantamiento para que pueda establecer un vínculo con alfaro bebé. Siempre sostenga al bebé y el biberón. No apoye el biberón ni deje que alfaro bebé se quede dormido con él. Aprenda sobre el llanto  · Es normal que los bebés lloren de 1 a 3 horas al día. Algunos lloran más, otros menos.   · Los bebés no lloran para causarle molestias ni porque usted sea [employer]. · Llorar es la forma de comunicarse que tiene el bebé. Alfaro bebé puede [employer] o gases, necesitar un cambio de pañal, sentir frío o calor, sentirse solo o tenso. A veces, los bebés lloran por motivos desconocidos. · Si usted responde a las necesidades de alfaro bebé, hi aprenderá a confiar en usted. · Intente mantener la calma cuando alfaro bebé llore. Alfaro bebé se puede sentir más molesto si siente que usted [place]. Sepa cómo cuidar a alfaro recién nacido  · El muñón del cordón umbilical de alfaro bebé se caerá solo, por lo general entre las semanas 1 y 2. Para cuidar la karlos del cordón umbilical de alfaro bebé:  ? Limpie la karlos de la parte inferior del cordón umbilical 2 o 3 veces al día. ? Ponga especial atención en la karlos en donde el cordón se fija a la piel. ? Mantenga el pañal doblado debajo del cordón. ? Utilice khari toallita húmeda o algodón para darle un baño de esponja a alfaro bebé hasta que se le haya caído el muñón. · La primera evacuación intestinal oscura de alfaro bebé se conoce meg meconio. Después del meconio, el bebé tendrá chandrakant propios hábitos de evacuación intestinal.  ? Algunos bebés, especialmente aquellos que se alimentan con [employer], tienen varias evacuaciones al día. Otros tienen [employer] al día, o khari cada 2 o 3 días. ? Los bebés que son amamantados a menudo tienen evacuaciones sueltas amarillentas. Los bebés que se alimentan con leche de fórmula evacuan heces más sólidas. ? Si alfaro bebé tiene evacuaciones meg bolitas pequeñas, está estreñido. Después de 2 luis de estreñimiento, llame al médico de alfaro bebé. · Si alfaro bebé va a ser Jael Presser, usted puede cuidarlo en el hogar. ? Enjuáguele delicadamente el pene con agua tibia cada vez que le cambie los pañales. No intente retirar la membrana que se forma sobre el pene. Esta membrana desaparecerá por sí merle. Séquele la karlos con toques suaves de toalla.   ? [employer] a base de petróleo, meg berkleya, en la karlos del pañal que tendrá contacto con el pene de alfaro bebé. [place] evitará que el pañal se le pegue al bebé. ? Pregúntele al médico acerca de darle acetaminofén (Tylenol) a alfaro bebé para el dolor. ¿Dónde puede encontrar más información en inglés? Vaya a http://alessia-leigha.info/  Onesimo Herrera [id number] en la búsqueda para aprender más acerca de \"Semana 40 de alfaro embarazo: Instrucciones de cuidado. \"  Revisado: 29 mora, 2019Versión del contenido: 12.4  © 2068-7931 [address] [employer] [facility]. Las instrucciones de cuidado fueron adaptadas bajo licencia por [employer] (which disclaims liability or warranty for this information). Si usted tiene [place] afección médica o sobre estas instrucciones, siempre pregunte a alfaro profesional de yvette. Healthwise[employer] Incorporated niega toda garantía o responsabilidad por alfaro uso de esta información.

## 2020-04-20 NOTE — PROGRESS NOTES
Chief Complaint   Patient presents with    Routine Prenatal Visit     .  37w 1d today. No bleeding, LOF or contractions. +FM. 1. Have you been to the ER, urgent care clinic since your last visit? Hospitalized since your last visit? No    2. Have you seen or consulted any other health care providers outside of the [address] [place] [address] since your last visit? Include any pap smears or colon screening.  No

## 2020-04-20 NOTE — PROGRESS NOTES
I reviewed with the resident the medical history and the resident's findings on the physical examination. I discussed with the resident the patient's diagnosis and concur with the plan. 30yo G1 @ 37w1d by LMP=7wk scan at [facility] (scanned in media)  1. IUP: RH pos, s/p flu, PNV, anatomy normal, passed 3' GTT, S/p tdap, cephalic on sono, GBS collected    2. Gallstones: on US at [facility] on 9/21, scanned in media, asymptomatic   3. Hx depression: never on meds, no depression currently, would see for early PP depression screening   4. Rubella NI: offer vaccine PP   5. Thrombocytopenia: normal on citrated tube   6.   Obesity: f/up growth 21st%

## 2020-04-20 NOTE — PROGRESS NOTES
Return OB Visit       Subjective:   Ya Lepe 34 y.o.   MARCELL: 5/10/2020, by Last Menstrual Period  GA:  37w1d. LOF: none  Vaginal bleeding: none  Fetal movement: present  Contractions: none  PNV: taking    Allergies- reviewed:   No Known Allergies  Medications- reviewed:   Current Outpatient Medications   Medication Sig    prenatal AISHWARYAF48/TASHIAV fum/folic (PRENATAL VITAMIN) 27 mg iron- 800 mcg tab tablet TK 1 T PO  D     No current facility-administered medications for this visit. Past Medical History- reviewed:  Past Medical History:   Diagnosis Date    Depression     never on Rx, was depressed when her mother  9 yr ago    Gallstones      Past Surgical History- reviewed:   No past surgical history on file.   Social History- reviewed:  Social History     Socioeconomic History    Marital status: UNKNOWN     Spouse name: Not on file    Number of children: Not on file    Years of education: Not on file    Highest education level: Not on file   Occupational History    Not on file   Social Needs    Financial resource strain: Not on file    Food insecurity     Worry: Not on file     Inability: Not on file   [employer] needs     Medical: Not on file     Non-medical: Not on file   Tobacco Use    Smoking status: Never Smoker    Smokeless tobacco: Never Used   Substance and Sexual Activity    Alcohol use: Not Currently    Drug use: Never    Sexual activity: Not Currently   Lifestyle    Physical activity     Days per week: Not on file     Minutes per session: Not on file    Stress: Not on file   Relationships    Social connections     Talks on phone: Not on file     Gets together: Not on file     Attends Mosque service: Not on file     Active member of club or organization: Not on file     Attends meetings of clubs or organizations: Not on file     Relationship status: Not on file    Intimate partner violence     Fear of current or ex partner: Not on file Emotionally abused: Not on file     Physically abused: Not on file     Forced sexual activity: Not on file   Other Topics Concern    Not on file   Social History Narrative    Not on file     Immunizations- reviewed:   Immunization History   Administered Date(s) Administered    Influenza Vaccine (Quad) PF 10/18/2019    Tdap 2020       Objective:     Visit Vitals  /65   Pulse 86   Temp 97.7 °F (36.5 °C) (Oral)   Resp 16   Ht 5' 2.6\" (1.59 m)   Wt 205 lb (93 kg)   LMP 2019 (Exact Date)   SpO2 98%   BMI 36.78 kg/m²       Physical Exam:  GENERAL APPEARANCE: alert, well appearing, in no apparent distress  ABDOMEN: gravid, fundal height  36cm, FHT present at 140 bpm  PSYCH: normal mood and affect  CERVIX: chaperoned by Izabella Godwin LPN. Thick/closed/high. Not confirmed by Attending    Labs  Recent Results (from the past 12 hour(s))   AMB POC URINALYSIS DIP STICK AUTO W/O MICRO    Collection Time: 20 10:04 AM   Result Value Ref Range    Color (UA POC) Yellow     Clarity (UA POC) Clear     Glucose (UA POC) Negative Negative    Bilirubin (UA POC) Negative Negative    Ketones (UA POC) Negative Negative    Specific gravity (UA POC) 1.010 1.001 - 1.035    Blood (UA POC) Negative Negative    pH (UA POC) 6.0 4.6 - 8.0    Protein (UA POC) Negative Negative    Urobilinogen (UA POC) 0.2 mg/dL 0.2 - 1    Nitrites (UA POC) Negative Negative    Leukocyte esterase (UA POC) Negative Negative         Assessment         ICD-10-CM ICD-9-CM    1. Prenatal care in third trimester Z34.93 V22.1 AMB POC URINALYSIS DIP STICK AUTO W/O MICRO         Plan   34 y.o.  37w1d MARCELL 5/10/2020, by Last Menstrual Period here for return OB visit       PNL: O+, Ab screen neg, CBC + hgb fract wnl. Rubella non-immune. VZV immune. HepBsAg, HIV, RPR neg. GC/C neg. Pap NILM. 1hr GTT elevated; 3hr GTT wnl, HgbA1c 4.8.  S/p Tdap.      SIUP in 3rd Trimester  Pregnancy complicated by maternal obesity, rubella non-immune, cholelithiasis, hx depression  · GBS today, confirmed cephalic presentation on US - scanned into media  · Maternal Obesity: BMI >35  ? Growth scan 4/13 - EFW 21%, follow-up as clinically indicated  · Cholelithiasis: gallstones seen on [facility] at [facility] 9/21 (scanned in media). Asx  · Hx Depression: no current sx. Never been on meds for this  ? Will require early post-partum depression screening  · Rubella Non-Immune:   ? Will require post-partum immunization  · Thrombocytopenia: Resolved. Plt 127 on 2/7; wnl on repeat 3/13 in citrate tube (plt 196)  · Follow-up for telephone visit for next 2 weeks and in person the following week      Orders Placed This Encounter    AMB POC URINALYSIS DIP STICK AUTO W/O MICRO     Labor precautions discussed, including: Regular painful contractions, lasting for greater than one hour, taking your breath away; any vaginal bleeding; any leakage of fluid; or absent or decreased fetal movement. Call M.D. on call if any of these symptoms or signs occur. I have discussed the diagnosis with the patient and the intended plan as seen in the above orders. The patient has received an after-visit summary and questions were answered concerning future plans. I have discussed medication side effects and warnings with the patient as well. Informed pt to return to the office or go to the ER if she experiences vaginal bleeding, vaginal discharge, leaking of fluid, pelvic cramping.     Pt discussed with Dr. Stephen Slater (attending physician)    Franny Cohn MD  Family Medicine Resident

## 2020-04-23 LAB
BACTERIA SPEC CULT: NORMAL
SERVICE CMNT-IMP: NORMAL

## 2020-05-01 ENCOUNTER — ROUTINE PRENATAL (OUTPATIENT)
Dept: FAMILY MEDICINE CLINIC | Age: 30
End: 2020-05-01

## 2020-05-01 DIAGNOSIS — O99.119 THROMBOCYTOPENIA AFFECTING PREGNANCY (HCC): ICD-10-CM

## 2020-05-01 DIAGNOSIS — O99.210 OBESITY IN PREGNANCY: ICD-10-CM

## 2020-05-01 DIAGNOSIS — Z34.93 PRENATAL CARE IN THIRD TRIMESTER: Primary | ICD-10-CM

## 2020-05-01 DIAGNOSIS — K80.20 GALLSTONES: ICD-10-CM

## 2020-05-01 DIAGNOSIS — Z86.59 HISTORY OF DEPRESSION: ICD-10-CM

## 2020-05-01 DIAGNOSIS — D69.6 THROMBOCYTOPENIA AFFECTING PREGNANCY (HCC): ICD-10-CM

## 2020-05-01 DIAGNOSIS — O09.899 RUBELLA NON-IMMUNE STATUS, ANTEPARTUM: ICD-10-CM

## 2020-05-01 DIAGNOSIS — Z28.39 RUBELLA NON-IMMUNE STATUS, ANTEPARTUM: ICD-10-CM

## 2020-05-01 NOTE — PROGRESS NOTES
Jose Carlos Randee  34 y.o. female  1990  [address] [address]  [id number]    [phone] (home)      [address]:    [facility] Telephone Encounter  Melanie Devine       Encounter Date: 2020 at 4:21 PM    Consent:  She and/or the health care decision maker is aware that this encounter will be billed as a routine OB appointment and that she may receive a bill for this telephone service, depending on her insurance coverage, and has provided verbal consent to proceed: Yes    Follow-up Prenatal     History of Present Illness   Contractions: no  LOF: no  Vaginal bleeding: no  Fetal movement: yes    Bp monitorin/75    Vitals/Objective:   General: Patient speaking in complete sentences without effort. Normal speech and cooperative. Due to this being a Virtual Check-in/Telephone evaluation, many elements of the physical examination are unable to be assessed. Assessment and Plan:     30yo G1 @ 38w5d by LMP=7wk scan at [facility] (scanned in media)  1. IUP: RH pos, s/p flu, PNV, anatomy normal, passed 3' GTT, S/p tdap, cephalic on sono, GBS neg   2. Gallstones: on US at [facility] on , scanned in media, asymptomatic   3. Hx depression: never on meds, no depression currently, would see for early PP depression screening   4. Rubella NI: offer vaccine PP   5. Thrombocytopenia: normal on citrated tube   6. Obesity: f/up growth 21st% on      -Patient informed of her next telephone appointment: 2020  -Advised to come in sooner for any concerns  -Pt informed that after 28 wk she will be asked to do weekly home BP monitoring and it was recommended she buy or borrow a cuff ahead of time. Alternative is going to a grocery store/pharmacy to check. One BP should be checked weekly and written in a log >28 weeks.  -Patient educated on kick counts (after 28 weeks): baby should move 10 times in 2 hours (can be a kick, roll, flutter, swish). -Patient was reminded about social distancing and to avoid going into public when possible. Patient understands that this encounter was a temporary measure, and the importance of further follow up and examination was emphasized. Patient verbalized understanding. I affirm this is a Patient Initiated Episode with an Established Patient who has not had a related appointment within my department in the past 7 days or scheduled within the next 24 hours. Note: not billable if this call serves to triage the patient into an appointment for the relevant concern      Electronically Signed: Royetta Saint, DO  Providers location when delivering service: clinic      ICD-10-CM ICD-9-CM    1. Prenatal care in third trimester Z34.93 V22.1    2. Obesity in pregnancy O99.210 649.10    3. Rubella non-immune status, antepartum O99.89 646.83     Z28.3 V15.83    4. Thrombocytopenia affecting pregnancy ([facility]) O99.119 649.30     D69.6 287.5    5. History of depression Z86.59 V11.8    6. Gallstones K80.20 574.20        Pursuant to the emergency declaration under the [facility] waiver authority and the [employer] Act, this Virtual  Visit was conducted, with patient's consent, to reduce the patient's risk of exposure to COVID-19 and provide continuity of care for an established patient. History   Patients past medical, surgical and family histories were personally reviewed and updated.   yes    Patient Active Problem List   Diagnosis Code    Thrombocytopenia affecting pregnancy ([facility] [address] O99.119, D69.6    Rubella non-immune status, antepartum O99.89, Z28.3    History of depression Z86.59    Gallstones K80.20    Obesity in pregnancy O99.210              Current Medications/Allergies   Medications and Allergies reviewed:    Current Outpatient Medications   Medication Sig Dispense Refill    prenatal BTCD20/MNQX fum/folic (PRENATAL VITAMIN) 27 mg iron- 800 mcg tab tablet TK 1 T PO  D  0     No Known Allergies

## 2020-05-06 ENCOUNTER — ROUTINE PRENATAL (OUTPATIENT)
Dept: FAMILY MEDICINE CLINIC | Age: 30
End: 2020-05-06

## 2020-05-06 DIAGNOSIS — K80.20 GALLSTONES: ICD-10-CM

## 2020-05-06 DIAGNOSIS — O99.210 OBESITY IN PREGNANCY: ICD-10-CM

## 2020-05-06 DIAGNOSIS — Z86.59 HISTORY OF DEPRESSION: ICD-10-CM

## 2020-05-06 DIAGNOSIS — O09.899 RUBELLA NON-IMMUNE STATUS, ANTEPARTUM: ICD-10-CM

## 2020-05-06 DIAGNOSIS — Z3A.39 39 WEEKS GESTATION OF PREGNANCY: Primary | ICD-10-CM

## 2020-05-06 DIAGNOSIS — Z28.39 RUBELLA NON-IMMUNE STATUS, ANTEPARTUM: ICD-10-CM

## 2020-05-06 NOTE — PROGRESS NOTES
Chrissie Cooper  34 y.o. female  1990  [address] [facility] [address]  [id number]    [phone] (home)      [address]:    [facility] Telephone Encounter  Melanie Sanabria       Encounter Date: 2020 at 8:36 PM    Consent:  She and/or the health care decision maker is aware that this encounter will be billed as a routine OB appointment and that she may receive a bill for this telephone service, depending on her insurance coverage, and has provided verbal consent to proceed: Yes    Follow-up Prenatal     History of Present Illness   Contractions: no  LOF: no  Vaginal bleeding: no  Fetal movement: yes  Compliant with prenatal vitamins: yes  BP: 104/53    History   Patients past medical, surgical and family histories were personally reviewed and updated. Patient Active Problem List   Diagnosis Code    Thrombocytopenia affecting pregnancy ([facility] [address] O99.119, D69.6    Rubella non-immune status, antepartum O99.89, Z28.3    History of depression Z86.59    Gallstones K80.20    Obesity in pregnancy O99.210       Vitals/Objective:   General: Patient speaking in complete sentences without effort. Normal speech and cooperative. Due to this being a Virtual Check-in/Telephone evaluation, many elements of the physical examination are unable to be assessed. Assessment and Plan:   Chrissie Cooper is a 34 y.o.  @ 39w3d evaluated by telephone. SIUP:  - Labs: O+, Ab screen neg, CBC + hgb fract wnl. Rubella non-immune. VZV immune. HepBsAg, HIV, RPR neg. GC/C neg. Pap NILM.  1hr GTT elevated; 3hr GTT wnl, HgbA1c 4.8, GBS neg   - Genetic screen: Panorama and Horizon are normal  - Immunizations: s/p TDAP  - Ultrasound: Growth scan  - EFW 21%, follow-up as clinically indicated    Pregnancy Concerns:  - Gallstones: on US at [facility] on , scanned in media, asymptomatic   - Hx depression: never on meds, no depression currently, would see for early PP depression screening   - Rubella NI: offer vaccine PP   - Thrombocytopenia: normal on citrated tube, non-EDTA tube to be used in the future  - Obesity: f/up growth 21st% on 4/13     Other information:   - Patient informed of her next in-office appointment: 5/11 at 8:30 AM with Dr. Angela Jose  - Tate Pardo to come in sooner for any concerns  - Pt informed that after 28 wk she will be asked to do weekly home BP monitoring and it was recommended she buy or borrow a cuff ahead of time. One BP should be checked weekly and written in a log >28 weeks. - Patient educated on kick counts (after 28 weeks): baby should move 10X in 2 hours (can be a kick, roll, flutter, swish). - Patient was reminded about social distancing and to avoid going into public when possible. Patient understands that this encounter was a temporary measure, and the importance of further follow up and examination was emphasized. Patient verbalized understanding. I affirm this is a Patient Initiated Episode with an Established Patient who has not had a related appointment within my department in the past 7 days or scheduled within the next 24 hours. Note: not billable if this call serves to triage the patient into an appointment for the relevant concern      Electronically Signed: DO Elly    Providers location when delivering service: home     No diagnosis found. Pursuant to the emergency declaration under the [facility], 1135 waiver authority and the [employer] Act, this Virtual  Visit was conducted, with patient's consent, to reduce the patient's risk of exposure to COVID-19 and provide continuity of care for an established patient.         Current Medications/Allergies   Medications and Allergies reviewed:    Current Outpatient Medications   Medication Sig Dispense Refill    prenatal QBJD28/KKMU fum/folic (PRENATAL VITAMIN) 27 mg iron- 800 mcg tab tablet TK 1 T PO  D  0     No Known Allergies

## 2020-05-11 ENCOUNTER — ROUTINE PRENATAL (OUTPATIENT)
Dept: FAMILY MEDICINE CLINIC | Age: 30
End: 2020-05-11

## 2020-05-11 ENCOUNTER — HOSPITAL ENCOUNTER (INPATIENT)
Age: 30
LOS: 2 days | Discharge: HOME | End: 2020-05-13
Attending: FAMILY MEDICINE | Admitting: OBSTETRICS & GYNECOLOGY
Payer: SELF-PAY

## 2020-05-11 ENCOUNTER — HOSPITAL ENCOUNTER (OUTPATIENT)
Dept: LAB | Age: 30
Discharge: HOME | End: 2020-05-11

## 2020-05-11 ENCOUNTER — ANESTHESIA EVENT (OUTPATIENT)
Dept: LABOR AND DELIVERY | Age: 30
End: 2020-05-11
Payer: SELF-PAY

## 2020-05-11 ENCOUNTER — ANESTHESIA (OUTPATIENT)
Dept: LABOR AND DELIVERY | Age: 30
End: 2020-05-11
Payer: SELF-PAY

## 2020-05-11 VITALS
TEMPERATURE: 98.1 F | DIASTOLIC BLOOD PRESSURE: 71 MMHG | BODY MASS INDEX: 37.47 KG/M2 | WEIGHT: 211.5 LBS | HEART RATE: 78 BPM | RESPIRATION RATE: 16 BRPM | HEIGHT: 63 IN | SYSTOLIC BLOOD PRESSURE: 104 MMHG | OXYGEN SATURATION: 98 %

## 2020-05-11 DIAGNOSIS — Z86.59 HISTORY OF DEPRESSION: ICD-10-CM

## 2020-05-11 DIAGNOSIS — O99.210 OBESITY IN PREGNANCY: ICD-10-CM

## 2020-05-11 DIAGNOSIS — O99.119 THROMBOCYTOPENIA AFFECTING PREGNANCY (HCC): ICD-10-CM

## 2020-05-11 DIAGNOSIS — D69.6 THROMBOCYTOPENIA AFFECTING PREGNANCY (HCC): ICD-10-CM

## 2020-05-11 DIAGNOSIS — O09.899 RUBELLA NON-IMMUNE STATUS, ANTEPARTUM: ICD-10-CM

## 2020-05-11 DIAGNOSIS — O48.0 POST-TERM PREGNANCY, 40-42 WEEKS OF GESTATION: ICD-10-CM

## 2020-05-11 DIAGNOSIS — Z28.39 RUBELLA NON-IMMUNE STATUS, ANTEPARTUM: ICD-10-CM

## 2020-05-11 DIAGNOSIS — K80.20 GALLSTONES: ICD-10-CM

## 2020-05-11 DIAGNOSIS — O48.0 POST-TERM PREGNANCY, 40-42 WEEKS OF GESTATION: Primary | ICD-10-CM

## 2020-05-11 PROBLEM — Z37.9 NORMAL LABOR: Status: ACTIVE | Noted: 2020-05-11

## 2020-05-11 PROBLEM — Z34.90 PREGNANCY: Status: ACTIVE | Noted: 2020-05-11

## 2020-05-11 LAB
BASOPHILS # BLD: 0 K/UL (ref 0–0.1)
BASOPHILS NFR BLD: 0 % (ref 0–1)
BILIRUB UR QL STRIP: NEGATIVE
COMMENT, HOLDF: NORMAL
DIFFERENTIAL METHOD BLD: ABNORMAL
EOSINOPHIL # BLD: 0.1 K/UL (ref 0–0.4)
EOSINOPHIL NFR BLD: 1 % (ref 0–7)
ERYTHROCYTE [DISTWIDTH] IN BLOOD BY AUTOMATED COUNT: 13 % (ref 11.5–14.5)
ERYTHROCYTE [DISTWIDTH] IN BLOOD BY AUTOMATED COUNT: 13.6 % (ref 11.5–14.5)
GLUCOSE UR-MCNC: NEGATIVE MG/DL
HCT VFR BLD AUTO: 38.7 % (ref 35–47)
HCT VFR BLD AUTO: 42.6 % (ref 35–47)
HGB BLD-MCNC: 13.4 G/DL (ref 11.5–16)
HGB BLD-MCNC: 13.6 G/DL (ref 11.5–16)
IMM GRANULOCYTES # BLD AUTO: 0.1 K/UL (ref 0–0.04)
IMM GRANULOCYTES NFR BLD AUTO: 1 % (ref 0–0.5)
KETONES P FAST UR STRIP-MCNC: NEGATIVE MG/DL
LYMPHOCYTES # BLD: 1.4 K/UL (ref 0.8–3.5)
LYMPHOCYTES NFR BLD: 14 % (ref 12–49)
MCH RBC QN AUTO: 28.6 PG (ref 26–34)
MCH RBC QN AUTO: 30.1 PG (ref 26–34)
MCHC RBC AUTO-ENTMCNC: 31.9 G/DL (ref 30–36.5)
MCHC RBC AUTO-ENTMCNC: 34.6 G/DL (ref 30–36.5)
MCV RBC AUTO: 87 FL (ref 80–99)
MCV RBC AUTO: 89.5 FL (ref 80–99)
MONOCYTES # BLD: 0.5 K/UL (ref 0–1)
MONOCYTES NFR BLD: 5 % (ref 5–13)
NEUTS SEG # BLD: 7.7 K/UL (ref 1.8–8)
NEUTS SEG NFR BLD: 79 % (ref 32–75)
NRBC # BLD: 0 K/UL (ref 0–0.01)
NRBC # BLD: 0 K/UL (ref 0–0.01)
NRBC BLD-RTO: 0 PER 100 WBC
NRBC BLD-RTO: 0 PER 100 WBC
PH UR STRIP: 5.5 [PH] (ref 4.6–8)
PLATELET # BLD AUTO: 192 K/UL (ref 150–400)
PLATELET # BLD AUTO: 192 K/UL (ref 150–400)
PMV BLD AUTO: 10.4 FL (ref 8.9–12.9)
PMV BLD AUTO: 10.5 FL (ref 8.9–12.9)
PROT UR QL STRIP: NEGATIVE
RBC # BLD AUTO: 4.45 M/UL (ref 3.8–5.2)
RBC # BLD AUTO: 4.76 M/UL (ref 3.8–5.2)
SAMPLES BEING HELD,HOLD: NORMAL
SP GR UR STRIP: 1.02 (ref 1–1.03)
UA UROBILINOGEN AMB POC: NORMAL (ref 0.2–1)
URINALYSIS CLARITY POC: NORMAL
URINALYSIS COLOR POC: YELLOW
URINE BLOOD POC: NEGATIVE
URINE LEUKOCYTES POC: NORMAL
URINE NITRITES POC: NEGATIVE
WBC # BLD AUTO: 10.6 K/UL (ref 3.6–11)
WBC # BLD AUTO: 9.6 K/UL (ref 3.6–11)

## 2020-05-11 PROCEDURE — 76060000078 HC EPIDURAL ANESTHESIA: Performed by: ANESTHESIOLOGY

## 2020-05-11 PROCEDURE — 36415 COLL VENOUS BLD VENIPUNCTURE: CPT

## 2020-05-11 PROCEDURE — 74011250636 HC RX REV CODE- 250/636: Performed by: ANESTHESIOLOGY

## 2020-05-11 PROCEDURE — 76815 OB US LIMITED FETUS(S): CPT | Performed by: OBSTETRICS & GYNECOLOGY

## 2020-05-11 PROCEDURE — 0UQGXZZ REPAIR VAGINA, EXTERNAL APPROACH: ICD-10-PCS | Performed by: OBSTETRICS & GYNECOLOGY

## 2020-05-11 PROCEDURE — 74011250636 HC RX REV CODE- 250/636: Performed by: OBSTETRICS & GYNECOLOGY

## 2020-05-11 PROCEDURE — 75410000003 HC RECOV DEL/VAG/CSECN EA 0.5 HR: Performed by: OBSTETRICS & GYNECOLOGY

## 2020-05-11 PROCEDURE — 74011000250 HC RX REV CODE- 250: Performed by: ANESTHESIOLOGY

## 2020-05-11 PROCEDURE — 74011250637 HC RX REV CODE- 250/637: Performed by: OBSTETRICS & GYNECOLOGY

## 2020-05-11 PROCEDURE — 75410000000 HC DELIVERY VAGINAL/SINGLE: Performed by: OBSTETRICS & GYNECOLOGY

## 2020-05-11 PROCEDURE — 00HU33Z INSERTION OF INFUSION DEVICE INTO SPINAL CANAL, PERCUTANEOUS APPROACH: ICD-10-PCS | Performed by: ANESTHESIOLOGY

## 2020-05-11 PROCEDURE — 75410000002 HC LABOR FEE PER 1 HR: Performed by: OBSTETRICS & GYNECOLOGY

## 2020-05-11 PROCEDURE — 75810000275 HC EMERGENCY DEPT VISIT NO LEVEL OF CARE

## 2020-05-11 PROCEDURE — 74011250636 HC RX REV CODE- 250/636

## 2020-05-11 PROCEDURE — 85025 COMPLETE CBC W/AUTO DIFF WBC: CPT

## 2020-05-11 PROCEDURE — 77030014125 HC TY EPDRL BBMI -B: Performed by: ANESTHESIOLOGY

## 2020-05-11 PROCEDURE — 65270000029 HC RM PRIVATE

## 2020-05-11 PROCEDURE — 74011250636 HC RX REV CODE- 250/636: Performed by: STUDENT IN AN ORGANIZED HEALTH CARE EDUCATION/TRAINING PROGRAM

## 2020-05-11 RX ORDER — NALOXONE HYDROCHLORIDE 0.4 MG/ML
0.4 INJECTION, SOLUTION INTRAMUSCULAR; INTRAVENOUS; SUBCUTANEOUS AS NEEDED
Status: DISCONTINUED | OUTPATIENT
Start: 2020-05-11 | End: 2020-05-12

## 2020-05-11 RX ORDER — MISOPROSTOL 200 UG/1
800 TABLET ORAL ONCE
Status: COMPLETED | OUTPATIENT
Start: 2020-05-12 | End: 2020-05-11

## 2020-05-11 RX ORDER — SODIUM CHLORIDE, SODIUM LACTATE, POTASSIUM CHLORIDE, CALCIUM CHLORIDE 600; 310; 30; 20 MG/100ML; MG/100ML; MG/100ML; MG/100ML
125 INJECTION, SOLUTION INTRAVENOUS CONTINUOUS
Status: DISCONTINUED | OUTPATIENT
Start: 2020-05-11 | End: 2020-05-12

## 2020-05-11 RX ORDER — SODIUM CHLORIDE 0.9 % (FLUSH) 0.9 %
5-40 SYRINGE (ML) INJECTION EVERY 8 HOURS
Status: DISCONTINUED | OUTPATIENT
Start: 2020-05-11 | End: 2020-05-12

## 2020-05-11 RX ORDER — FENTANYL/BUPIVACAINE/NS/PF 2-1250MCG
10 PREFILLED PUMP RESERVOIR EPIDURAL CONTINUOUS
Status: DISCONTINUED | OUTPATIENT
Start: 2020-05-11 | End: 2020-05-12

## 2020-05-11 RX ORDER — OXYTOCIN/0.9 % SODIUM CHLORIDE 30/500 ML
0-25 PLASTIC BAG, INJECTION (ML) INTRAVENOUS
Status: DISCONTINUED | OUTPATIENT
Start: 2020-05-11 | End: 2020-05-13 | Stop reason: HOSPADM

## 2020-05-11 RX ORDER — MAG HYDROX/ALUMINUM HYD/SIMETH 200-200-20
30 SUSPENSION, ORAL (FINAL DOSE FORM) ORAL
Status: DISCONTINUED | OUTPATIENT
Start: 2020-05-11 | End: 2020-05-12

## 2020-05-11 RX ORDER — FENTANYL CITRATE 50 UG/ML
INJECTION, SOLUTION INTRAMUSCULAR; INTRAVENOUS AS NEEDED
Status: DISCONTINUED | OUTPATIENT
Start: 2020-05-11 | End: 2020-05-11 | Stop reason: HOSPADM

## 2020-05-11 RX ORDER — LIDOCAINE HYDROCHLORIDE AND EPINEPHRINE 15; 5 MG/ML; UG/ML
INJECTION, SOLUTION EPIDURAL AS NEEDED
Status: DISCONTINUED | OUTPATIENT
Start: 2020-05-11 | End: 2020-05-11 | Stop reason: HOSPADM

## 2020-05-11 RX ORDER — OXYTOCIN/0.9 % SODIUM CHLORIDE 20/1000 ML
PLASTIC BAG, INJECTION (ML) INTRAVENOUS
Status: COMPLETED
Start: 2020-05-11 | End: 2020-05-11

## 2020-05-11 RX ORDER — OXYTOCIN/0.9 % SODIUM CHLORIDE 20/1000 ML
500 PLASTIC BAG, INJECTION (ML) INTRAVENOUS
Status: DISCONTINUED | OUTPATIENT
Start: 2020-05-12 | End: 2020-05-13 | Stop reason: HOSPADM

## 2020-05-11 RX ORDER — NALBUPHINE HYDROCHLORIDE 10 MG/ML
10 INJECTION, SOLUTION INTRAMUSCULAR; INTRAVENOUS; SUBCUTANEOUS
Status: DISCONTINUED | OUTPATIENT
Start: 2020-05-11 | End: 2020-05-12

## 2020-05-11 RX ORDER — SODIUM CHLORIDE 0.9 % (FLUSH) 0.9 %
5-40 SYRINGE (ML) INJECTION AS NEEDED
Status: DISCONTINUED | OUTPATIENT
Start: 2020-05-11 | End: 2020-05-13 | Stop reason: HOSPADM

## 2020-05-11 RX ORDER — EPHEDRINE SULFATE/0.9% NACL/PF 50 MG/5 ML
10 SYRINGE (ML) INTRAVENOUS
Status: DISCONTINUED | OUTPATIENT
Start: 2020-05-11 | End: 2020-05-12

## 2020-05-11 RX ORDER — METHYLERGONOVINE MALEATE 0.2 MG/ML
0.2 INJECTION INTRAVENOUS ONCE
Status: COMPLETED | OUTPATIENT
Start: 2020-05-12 | End: 2020-05-11

## 2020-05-11 RX ORDER — ONDANSETRON 2 MG/ML
4 INJECTION INTRAMUSCULAR; INTRAVENOUS
Status: DISCONTINUED | OUTPATIENT
Start: 2020-05-11 | End: 2020-05-12

## 2020-05-11 RX ORDER — LIDOCAINE HYDROCHLORIDE 10 MG/ML
20 INJECTION INFILTRATION; PERINEURAL ONCE
Status: DISCONTINUED | OUTPATIENT
Start: 2020-05-11 | End: 2020-05-12

## 2020-05-11 RX ADMIN — Medication 500 ML/HR: at 23:39

## 2020-05-11 RX ADMIN — MISOPROSTOL 800 MCG: 200 TABLET ORAL at 23:18

## 2020-05-11 RX ADMIN — SODIUM CHLORIDE, SODIUM LACTATE, POTASSIUM CHLORIDE, AND CALCIUM CHLORIDE 125 ML/HR: 600; 310; 30; 20 INJECTION, SOLUTION INTRAVENOUS at 21:12

## 2020-05-11 RX ADMIN — SODIUM CHLORIDE, SODIUM LACTATE, POTASSIUM CHLORIDE, AND CALCIUM CHLORIDE 1000 ML: 600; 310; 30; 20 INJECTION, SOLUTION INTRAVENOUS at 16:45

## 2020-05-11 RX ADMIN — SODIUM CHLORIDE, SODIUM LACTATE, POTASSIUM CHLORIDE, AND CALCIUM CHLORIDE 125 ML/HR: 600; 310; 30; 20 INJECTION, SOLUTION INTRAVENOUS at 16:04

## 2020-05-11 RX ADMIN — Medication 10 ML/HR: at 18:29

## 2020-05-11 RX ADMIN — LIDOCAINE HYDROCHLORIDE AND EPINEPHRINE 3.5 ML: 15; 5 INJECTION, SOLUTION EPIDURAL at 18:18

## 2020-05-11 RX ADMIN — SODIUM CHLORIDE, SODIUM LACTATE, POTASSIUM CHLORIDE, AND CALCIUM CHLORIDE 1000 ML: 600; 310; 30; 20 INJECTION, SOLUTION INTRAVENOUS at 17:45

## 2020-05-11 RX ADMIN — SODIUM CHLORIDE, SODIUM LACTATE, POTASSIUM CHLORIDE, AND CALCIUM CHLORIDE 999 ML/HR: 600; 310; 30; 20 INJECTION, SOLUTION INTRAVENOUS at 19:52

## 2020-05-11 RX ADMIN — OXYTOCIN 2 MILLI-UNITS/MIN: 10 INJECTION INTRAVENOUS at 16:04

## 2020-05-11 RX ADMIN — OXYTOCIN 4 MILLI-UNITS/MIN: 10 INJECTION INTRAVENOUS at 16:35

## 2020-05-11 RX ADMIN — FENTANYL CITRATE 75 MCG: 0.05 INJECTION, SOLUTION INTRAMUSCULAR; INTRAVENOUS at 18:18

## 2020-05-11 RX ADMIN — FENTANYL CITRATE 25 MCG: 0.05 INJECTION, SOLUTION INTRAMUSCULAR; INTRAVENOUS at 18:12

## 2020-05-11 RX ADMIN — METHYLERGONOVINE MALEATE 0.2 MG: 0.2 INJECTION, SOLUTION INTRAMUSCULAR; INTRAVENOUS at 23:19

## 2020-05-11 RX ADMIN — OXYTOCIN 6 MILLI-UNITS/MIN: 10 INJECTION INTRAVENOUS at 18:55

## 2020-05-11 NOTE — PROGRESS NOTES
- Bedside and Verbal SBAR report from SALLIE Villanueva RN. Assumed care of pt at this time. 65 - RN at pt bedside performing shift assessment and repositioning pt to lateral left side with pillows for support.  - Recurrent decelerations noted on EFM. RN at pt bedside beginning IVF bolus at this time.  - Dr. Ramin Frank at pt bedside performing SVE at this time (/). 1 - RN unable to track ctx pattern accurately on EFM, so Dr. Ramin Frank at pt bedside placing IUPC at this time. After placement, RN repositioning pt to lateral right side with peanut ball between legs. RN also placed bed into trendelenburg position.  - SALLIE Casper at pt bedside preparing delivery table at this time.  - RN at pt bedside performing SVE (c/c/0). RN to notify MD.     - RN notified Dr. Barak Godinez of pt's SVE. TORB from MD to begin pushing.  - RN at pt bedside beginning pushing with pt at this time. Pt legs in stirrups. 222 - RN removing Vo catheter at this time (1100 mL urine). 2303 -  of viable male infant at this time. 9/9 APGARs (1-min/5-min, respectively). 2318 - Ese Mcnair from MD to administer cytotec 800 mcg and methergine 0.2 mg. MD at pt bedside administering cytotec rectally at this time. 2319 - RN at pt bedside administering methergine IM via right leg at this time. 0 - MD performing straight cath at this time (amount indeterminate because urine emptied into delivery drape). Clean technique used, pt tolerated procedure well. 0000 - Pt still experiencing constant trickle. Dr. Ramin Frank at pt bedside placing vaginal packing at this time. VORB from MD to remove after 12 hours. 0100 - RN at pt bedside performing fundal check. Pt holding baby and requests bottle. RN to alert nursery RN.    0200 - RN at pt bedside performing fundal check. Pt denies pain. 0300 - RN at pt bedside performing fundal check. Pt holding baby upon RN entering room.     0400 - RN at pt bedside performing fundal check. Pt asleep upon RN entering room. 5 - RN at pt bedside assisting pt with ambulating to BR for first time since delivery. Pt gait steady. 0510 - After several minutes of pt trying to urinate on toilet, pt unable to urinate. RN performing straight cath at this time (700 mL urine emptied from bladder).

## 2020-05-11 NOTE — ANESTHESIA PROCEDURE NOTES
CSE Block    Start time: 5/11/2020 6:10 PM  End time: 5/11/2020 6:26 PM  Performed by: Tylor Adler DO  Authorized by: Tylor Adler DO     Pre-Procedure  Indications: procedure for pain    preanesthetic checklist: patient identified, risks and benefits discussed, anesthesia consent, site marked, patient being monitored and timeout performed    Timeout Time: 18:09        Procedure:   Patient Position:  Seated  Prep Region:  Lumbar  Prep: Betadine    Location:  L3-4    Epidural Needle:   Needle Type:  Elisabeth Layne  Needle Gauge:  18 G  Injection Technique:  Loss of resistance using air  Attempts:  1    Spinal Needle:   Needle Type:   Osvaldo  Needle Gauge:  27 G    Catheter:   Catheter Type:  Standard  Catheter Size:  20 G  Catheter at Skin Depth (cm):  10  Depth in Epidural Space (cm):  2.5  Events: no blood with aspiration, no cerebrospinal fluid with aspiration, no paresthesia and negative aspiration test    Test Dose:  Negative    Assessment:   Catheter Secured:  Tegaderm and tape  Insertion:  Uncomplicated  Patient tolerance:  Patient tolerated the procedure well with no immediate complications

## 2020-05-11 NOTE — H&P
[address] [facility] [address] [place] [address]   Office [phone], Fax [phone]      History & Physical    Name: Megan Singh MRN: [id number]  SSN: [id number]    YOB: 1990  Age: 27 y.o. Sex: female      Subjective:     Reason for Admission:  Pregnancy and Contractions    Estimated Date of Delivery: 5/10/20  OB History    Para Term  AB Living   1             SAB TAB Ectopic Molar Multiple Live Births                    # Outcome Date GA Lbr Pablo/2nd Weight Sex Delivery Anes PTL Lv   1 Current                History of Present Illness: Ms. Klaudia Casper is a 27 y.o.  female with an EGA of 40w1d with Estimated Date of Delivery: 5/10/20. Pregnancy has been complicated by obesity, rubella non immmunity, and thrombocytopenia. Patient complains of LOF and contractions. Reports gush of clear/white fluid at around 1400 with contractions approximately 5 minutes apart. Patient was seen by PCP in the morning w/ SVE 3/60/-2. Reports fetal movement. Patient denies chest pain, headache , nausea and vomiting, shortness of breath and vaginal bleeding . OB History    Para Term  AB Living   1             SAB TAB Ectopic Molar Multiple Live Births                    # Outcome Date GA Lbr Pablo/2nd Weight Sex Delivery Anes PTL Lv   1 Current              Past Medical History:   Diagnosis Date    Depression     never on Rx, was depressed when her mother  9 yr ago   [facility] Gallstones      No past surgical history on file. Social History     Occupational History    Not on file   Tobacco Use    Smoking status: Never Smoker    Smokeless tobacco: Never Used   Substance and Sexual Activity    Alcohol use: Not Currently    Drug use: Never    Sexual activity: Not Currently      No family history on file. No Known Allergies  Prior to Admission medications    Medication Sig Start Date End Date Taking?  Authorizing Provider   prenatal [id number]/THALIA fum/folic (PRENATAL VITAMIN) 27 mg iron- 800 mcg tab tablet TK 1 T PO  D 10/5/19   Provider, Historical        Review of Systems:  Constitutional: negative for fevers and chills  Respiratory: negative for cough or SOB  Cardiovascular: negative for chest pain  Gastrointestinal: negative for nausea and vomiting   MSK: No LE pain    Objective:     Vitals:    Vitals:    20 1438   BP: 100/59   Pulse: 92   Resp: 20      Temp (24hrs), Av.1 °F (36.7 °C), Min:98.1 °F (36.7 °C), Max:98.1 °F (36.7 °C)    BP  Min: 100/59  Max: 104/71     Physical Exam:  Patient without distress. Heart: Regular rate and rhythm, S1S2 present or without murmur or extra heart sounds  Lung: clear to auscultation throughout lung fields, no wheezes, no rales, no rhonchi and normal respiratory effort  Abdomen: gravid, nontender  Cervical Exam: /-2 (by Dr. Cesar Macedo)  Lower Extremities: No LE tenderness  Membranes:  Spontaneous Rupture of Membranes; Amniotic Fluid: clear fluid  Uterine Activity:  Frequency: Every 5 minutes   Fetal Heart Rate:  Reactive  Baseline: 140 per minute  Variability: moderate  Accelerations: yes  Decelerations: none       Lab/Data Review:  Recent Results (from the past 24 hour(s))   AMB POC URINALYSIS DIP STICK AUTO W/O MICRO    Collection Time: 20  9:04 AM   Result Value Ref Range    Color (UA POC) Yellow     Clarity (UA POC) Slightly Cloudy     Glucose (UA POC) Negative Negative    Bilirubin (UA POC) Negative Negative    Ketones (UA POC) Negative Negative    Specific gravity (UA POC) 1.020 1.001 - 1.035    Blood (UA POC) Negative Negative    pH (UA POC) 5.5 4.6 - 8.0    Protein (UA POC) Negative Negative    Urobilinogen (UA POC) 0.2 mg/dL 0.2 - 1    Nitrites (UA POC) Negative Negative    Leukocyte esterase (UA POC) 1+ Negative       Assessment and Plan:     Ms. Eboni Toledo is a 27 y.o.  female with an estimated gestational age of 44w3d who is admitted for SROM. SROM w/ SIUP: at 40w1d.     PNL:  O+, Ab screen neg, hgb fract wnl. Rubella non-immune. VZV immune. HepBsAg, HIV, RPR neg. GC/C neg. Pap NILM. 1hr GTT elevated; 3hr GTT wnl, HgbA1c 4.8, GBS neg.  Continuous toco/FHR monitoring   Ultrasound done at 36w1d (20): EFW 21%, anterior placenta, normal anatomy   Bedside TAUS confirmed cephalic presentation   Last SVE: /-2 (1545). Continue cervical checks to monitor for progression.  Start pitocin, titrate per protocol   Anticipate     Thrombocytopenia: Last plt 196 (wnl)  - CBC ordered    Rubella non-immune:   - Vaccination postpartum    Hx of depression: stable. Never on meds. - Follow up w/ PCP 2 weeks postpartum    Hx of gallstones: Asymptomatic. [facility] US [facility] on .     Maternal Obesity: BMI 32.  - Ultrasound done at 36w1d (20): EFW 21%    Patient discussed with Dr. Ernesto Young DO  Family Medicine Resident

## 2020-05-11 NOTE — PROGRESS NOTES
Chief Complaint   Patient presents with    Routine Prenatal Visit     Vitals:    05/11/20 0845   BP: 104/71   BP 1 Location: Left arm   BP Patient Position: Sitting   Pulse: 78   Resp: 16   Temp: 98.1 °F (36.7 °C)   TempSrc: Oral   SpO2: 98%   Weight: 211 lb 8 oz (95.9 kg)   Height: 5' 2.6\" (1.59 m)       1. Have you been to the ER, urgent care clinic since your last visit? Hospitalized since your last visit? No     2. Have you seen or consulted any other health care providers outside of the [address] [place] [address] since your last visit? Include any pap smears or colon screening.  No

## 2020-05-11 NOTE — PATIENT INSTRUCTIONS
Week 40 of Your Pregnancy: Care Instructions  Your Care Instructions    By week 40, you have reached your due date. Your baby could be coming any day. But it's a good idea to think ahead to the next few weeks and what might happen. If this is your first time having a baby, try not to worry. If you don't start labor on your own by 41 or 42 weeks, your doctor may recommend giving you medicines to start labor. This care sheet gives you information about how labor can be started. It also gives you some ideas about breathing exercises you can do if you start to feel anxious or if you are trying to relax. Follow-up care is a key part of your treatment and safety. Be sure to make and go to all appointments, and call your doctor if you are having problems. It's also a good idea to know your test results and keep a list of the medicines you take. How can you care for yourself at home? Learn how labor can be started  · If you and your baby are both healthy and ready, and if your cervix has started to open, your doctor may \"break your water\" (rupture the amniotic sac). This often starts labor. · If your cervix is not quite ready, you may get a medicine called Pitocin through an IV to start contractions. · If your cervix is still very firm, you may have prostaglandin tablets (misoprostol) placed in your vagina to soften the cervix. Try guided imagery to help you relax  · Find a comfortable place to sit or lie down. Close your eyes. · Start by just taking a few deep breaths to help you relax. · Picture a setting that is calm and peaceful. This could be a beach, a mountain setting, a meadow, or a scene that you choose. · Imagine your scene, and try to add some detail. For example, is there a breeze? What does the silvano look like? Is it clear, or are there clouds? · It often helps to add a path to your scene.  For example, as you enter the meadow, imagine a path leading you through the meadow to the trees on the other side. As you follow the path farther into the [facility] SITE you feel more and more relaxed. · When you are deep into your scene and are feeling relaxed, take a few minutes to breathe slowly and feel the calm. · When you are ready, slowly take yourself out of the scene back to the present. Tell yourself that you will feel relaxed and refreshed and will bring that sense of calm with you. · Count to 3, and open your eyes. Where can you learn more? Go to http://alessia-leigha.info/  Enter O166 in the search box to learn more about \"Week 40 of Your Pregnancy: Care Instructions. \"  Current as of: May 29, 2019Content Version: 12.4  © 7130-1052 Healthwise, Incorporated. Care instructions adapted under license by [employer] (which disclaims liability or warranty for this information). If you have questions about a medical condition or this instruction, always ask your healthcare professional. [place] [address] any warranty or liability for your use of this information. Semana 40 de alfaro embarazo: Instrucciones de cuidado  Week 40 of Your Pregnancy: Care Instructions  Instrucciones de cuidado    Para la semana 40, usted ha llegado a la fecha probable de [place]. Alfaro bebé podría venir en cualquier momento. Marie es khari buena idea pensar en el futuro para las próximas semanas y lo que podría suceder. Si esta es la primera vez que tiene un bebé, trate de no preocuparse. Si usted no comienza el trabajo de parto por sí [employer] 41 o 42, alfaro médico le puede recomendar medicamentos para inducir el Miles Dural de [place]. Esta hoja de [employer] acerca de cómo se puede inducir el Miles Dural de Audrey. Mesfin Jeremie ideas sobre los ejercicios de respiración que puede hacer si Angela Iker a sentirse ansiosa o si está tratando de [place]. La atención de seguimiento es khari parte clave de alfaro tratamiento y seguridad.  Asegúrese de hacer y acudir a todas las citas, y llame a alfaro médico si está teniendo problemas. También es khari buena idea saber los resultados de chandrakant exámenes y mantener khari lista de los medicamentos que pee. ¿Cómo puede cuidarse en el hogar? Aprenda cómo se puede inducir el trabajo de parto  · Si usted y alfaro bebé están saludables y preparados, y si alfaro dominik uterino ha comenzado a dilatarse, alfaro médico puede romper la \"reyna\" (ruptura del saco amniótico). Con frecuencia, esto determina el comienzo del trabajo de [place]. · Si el dominik uterino no está preparado todavía, se le podría administrar un medicamento llamado Pitocin por vía intravenosa (IV), para comenzar las contracciones. · Si el dominik uterino todavía está [address], podrían colocarle tabletas de prostaglandina (misoprostol) en la vagina para ablandar el dominik uterino. Pruebe con ejercicios de imaginación guiada para ayudarse a relajar  · Encuentre un lugar cómodo para sentarse o acostarse. Cierre los ojos. · Empiece tomando solo unas cuantas respiraciones profundas para relajarse. · Imagínese un ambiente que está en calma y en hernandez. [place] podría ser [address] [facility] [place] [facility], un entorno de [facility], [facility] pradera o khari escena que usted Tara Frizzle. · Imagine la escena y trate de añadirle algunos detalles. Por ejemplo: ¿Hay tamica?, ¿cómo es el andrew?, ¿está despejado el andrew, o hay nubes? · A menudo, ayuda agregar un sendero a la escena. Por ejemplo, al entrar en la pradera, imagine un alfredito que la conduce a través de la pradera a los árboles del otro lado. A medida que siga el alfredito avanzando por la pradera, se siente más y [place]. · Cuando esté muy metida en alfaro escena y se sienta relajada, tómese unos minutos para respirar lentamente y sentir la calma. · Cuando esté lista, sálgase poco a poco de la escena y regrese a la realidad. Dígase a sí misma que se sentirá relajada y vigorizada, y que se quedará con sam sensación de calma. · Cuente hasta 3 y mark los ojos.   ¿Dónde puede encontrar más información en inglés? Vaya a http://alessia-leigha.info/  Ky Muscat [address] en la búsqueda para aprender más acerca de \"Semana 36 de molina embarazo: Instrucciones de cuidado. \"  Revisado: 29 mora, 2019Versión del contenido: 12.4  © 2006-2020 23[employer]. Las instrucciones de cuidado fueron adaptadas bajo licencia por [employer] (which disclaims liability or warranty for this information). Si usted tiene [place] afección médica o sobre estas instrucciones, siempre pregunte a molina profesional de yvette. Healthwise, Incorporated niega toda garantía o responsabilidad por molina uso de esta información. Jeni Funk a molina médico raven el embarazo (después de 20 semanas)  Learning About When to Call Your Doctor During Pregnancy (After 20 Weeks)  Instrucciones de cuidado  Es normal que tenga inquietudes acerca de lo que podría ser un problema raven el Azam Green. Aunque la mayoría de las mujeres embarazadas no tienen ningún problema grave, es importante saber cuándo llamar a molina médico si tiene determinados síntomas o señales de trabajo de [place]. Estas son algunas sugerencias generales. Molina médico puede darle más información sobre cuándo llamar. Cuándo llamar a molina médico (después de 20 semanas)  Llame al 911 en cualquier momento que considere que necesita atención de [place]. Por ejemplo, llame si:  · Tiene sangrado vaginal intenso. · Tiene dolor repentino e intenso en el abdomen. · Se desmayó (perdió el conocimiento). · Tiene khari convulsión. · Ve o siente el cordón umbilical.  · Petra que está a punto de ally a guzman a molina bebé y no puede llegar en forma thompson al hospital.  Estephania Huger a molina médico ahora mismo o busque atención médica inmediata si:  · Tiene sangrado vaginal.  · Tiene dolor en el abdomen. · Tiene fiebre. · Tiene síntomas de preeclampsia, meg:  ? Hinchazón repentina de la sandro, las hemalatha o los pies.   ? Nuevos problemas de visión (meg oscurecimiento, bertrand borroso o bertrand puntos). ? Dolor de mariana intenso. · Tiene khari pérdida repentina de líquido por la vagina. (Piensa que rompió la reyna). · Piensa que puede shashank comenzado el Fabiola Zamora. Valera significa que ha tenido al menos 6 contracciones en [employer]. · Nota que alfaro bebé ha dejado de moverse o lo hace mucho menos de lo habitual.  · Tiene síntomas de khari infección urinaria. Estos pueden incluir:  ? Dolor o ardor al orinar. ? Necesidad de orinar con frecuencia sin poder eliminar mucha orina. ? Dolor en el flanco, que se encuentra david debajo de la caja torácica y [place] de la cintura en un lado de la espalda. ? [employer]. Preste especial atención a los cambios en alfaro yvette y asegúrese de comunicarse con alfaro médico si:  · Tiene flujo vaginal con un olor desagradable. · Tiene cambios en la piel, tales meg:  ? Salpullido. ? Comezón. ? Color amarillento en la piel. · Tiene otras inquietudes acerca de alfaro embarazo. Si tiene signos de trabajo de parto al llegar a las 37 [address] o más  Si tiene señales de Fabiola Santiago de parto a las 37 semanas o [place], es posible que alfaro médico le diga que llame cuando alfaro trabajo de parto se vuelva más [place]. Los síntomas del trabajo de parto activo incluyen:  · Contracciones que son regulares. · Contracciones a intervalos de menos de 5 minutos. · Contracciones raven las cuales es difícil hablar. La atención de seguimiento es khari parte clave de alfaro tratamiento y seguridad. Asegúrese de hacer y acudir a todas las citas, y llame a alfaro médico si está teniendo problemas. También es khari buena idea saber los resultados de chandrakant exámenes y mantener khari lista de los medicamentos que pee. ¿Dónde puede encontrar más información en inglés? Vaya a http://alessia-leigha.info/  Jose David N531 en la búsqueda para aprender más acerca de \"Aprenda cuándo llamar a alfaro médico raven el embarazo (después de 20 semanas). \"  Revisado: 29 mora, 2019Versión del contenido: 12.4  © 2882-2335 Healthwise, [employer]. Las instrucciones de cuidado fueron adaptadas bajo licencia por [facility] (which disclaims liability or warranty for this information). Si usted tiene [place] afección médica o sobre estas instrucciones, siempre pregunte a alfaro profesional de yvette. "[employer] niega toda garantía o responsabilidad por alfaro uso de esta información.

## 2020-05-11 NOTE — PROGRESS NOTES
I reviewed with the resident the medical history and the resident's findings on the physical examination. I discussed with the resident the patient's diagnosis and concur with the plan. NST (for postdates): 130s, mod leatha, +accels, no decels, no ctx   SVE: 3cm/60/-2, soft, mid position, cephalic by sutures     44KI G1 @ 40w1d by LMP=7wk scan at [facility] (scanned in media)  1. IUP: RH pos, s/p flu, PNV, anatomy normal, passed 3' GTT, S/p tdap, cephalic on sono, GBS neg   2. Gallstones: on US at [facility] on 9/21, scanned in media, asymptomatic   3. Hx depression: never on meds, no depression currently, would see for early PP depression screening   4. Rubella NI: offer vaccine PP   5. Thrombocytopenia: normal on citrated tube, repeating today   6. Obesity: f/up growth 21st% on 4/13     Post dates IOL scheduled 5/18, favorable [facility].

## 2020-05-11 NOTE — PROGRESS NOTES
Labor Progress Note  Report received from off going physician. Patient seen, fetal heart rate and contraction pattern evaluated. Pt just received epidural, comfortable. Physical Exam:  Cervical Exam: 5-6/80/-2 OP  Membranes: ruptured, clear  Uterine Activity: Q2-3  Fetal Heart Rate: baseline 130,   Accelerations: yes  Decelerations: none      Assessment/Plan:  Reassuring fetal status. Making cervical change.   Continue pitocin, expect     Shaan Heller MD

## 2020-05-11 NOTE — ANESTHESIA PREPROCEDURE EVALUATION
Relevant Problems   No relevant active problems       Anesthetic History   No history of anesthetic complications            Review of Systems / Medical History  Patient summary reviewed and nursing notes reviewed    Pulmonary  Within defined limits                 Neuro/Psych   Within defined limits           Cardiovascular  Within defined limits                Exercise tolerance: >4 METS     GI/Hepatic/Renal  Within defined limits              Endo/Other        Obesity     Other Findings   Comments:            Physical Exam    Airway  Mallampati: II    Neck ROM: normal range of motion   Mouth opening: Normal     Cardiovascular    Rhythm: regular  Rate: normal         Dental  No notable dental hx       Pulmonary  Breath sounds clear to auscultation               Abdominal         Other Findings            Anesthetic Plan    ASA: 2  Anesthesia type: spinal and epidural            Anesthetic plan and risks discussed with: Patient      Informed consent obtained via [facility] in person hospital .

## 2020-05-11 NOTE — PROGRESS NOTES
1430-Arrives to L&D - reports that she has been yuni every 5 minutes since her appointment earlier today and that at 1400 she SROM Clear fluid. 1442-Nitrazine positive clear fluid. Resident at bedside states baby vertex by ultrasound. 1545 - MD Burkett to bedside, SVE 4cm/90. Orders for pitocin to be started. 1645 - Requested epidural, LR preload started  1745-1st liter completed, called MD Bryn Echevarria; per MD second LR preload  1755-MD Lozano at bedside.    1809 Time out   1830 - Pt reports feeling comfortable   1855 - MD Tom Meraz at bedside - SVE 5/80 1915 Report given to oncoming nurse

## 2020-05-12 LAB
BASOPHILS # BLD: 0 K/UL (ref 0–0.1)
BASOPHILS NFR BLD: 0 % (ref 0–1)
DIFFERENTIAL METHOD BLD: ABNORMAL
EOSINOPHIL # BLD: 0 K/UL (ref 0–0.4)
EOSINOPHIL NFR BLD: 0 % (ref 0–7)
ERYTHROCYTE [DISTWIDTH] IN BLOOD BY AUTOMATED COUNT: 13.2 % (ref 11.5–14.5)
HCT VFR BLD AUTO: 34.4 % (ref 35–47)
HGB BLD-MCNC: 11.8 G/DL (ref 11.5–16)
IMM GRANULOCYTES # BLD AUTO: 0.1 K/UL (ref 0–0.04)
IMM GRANULOCYTES NFR BLD AUTO: 1 % (ref 0–0.5)
LYMPHOCYTES # BLD: 1.2 K/UL (ref 0.8–3.5)
LYMPHOCYTES NFR BLD: 8 % (ref 12–49)
MCH RBC QN AUTO: 29.8 PG (ref 26–34)
MCHC RBC AUTO-ENTMCNC: 34.3 G/DL (ref 30–36.5)
MCV RBC AUTO: 86.9 FL (ref 80–99)
MONOCYTES # BLD: 0.7 K/UL (ref 0–1)
MONOCYTES NFR BLD: 5 % (ref 5–13)
NEUTS SEG # BLD: 12.9 K/UL (ref 1.8–8)
NEUTS SEG NFR BLD: 86 % (ref 32–75)
NRBC # BLD: 0 K/UL (ref 0–0.01)
NRBC BLD-RTO: 0 PER 100 WBC
PLATELET # BLD AUTO: 180 K/UL (ref 150–400)
PMV BLD AUTO: 9.9 FL (ref 8.9–12.9)
RBC # BLD AUTO: 3.96 M/UL (ref 3.8–5.2)
WBC # BLD AUTO: 14.9 K/UL (ref 3.6–11)

## 2020-05-12 PROCEDURE — 74011250637 HC RX REV CODE- 250/637: Performed by: OBSTETRICS & GYNECOLOGY

## 2020-05-12 PROCEDURE — 85025 COMPLETE CBC W/AUTO DIFF WBC: CPT

## 2020-05-12 PROCEDURE — 36415 COLL VENOUS BLD VENIPUNCTURE: CPT

## 2020-05-12 PROCEDURE — 65270000029 HC RM PRIVATE

## 2020-05-12 RX ORDER — IBUPROFEN 800 MG/1
800 TABLET ORAL EVERY 8 HOURS
Status: DISCONTINUED | OUTPATIENT
Start: 2020-05-12 | End: 2020-05-13 | Stop reason: HOSPADM

## 2020-05-12 RX ORDER — ONDANSETRON 4 MG/1
4 TABLET, ORALLY DISINTEGRATING ORAL
Status: ACTIVE | OUTPATIENT
Start: 2020-05-12 | End: 2020-05-13

## 2020-05-12 RX ORDER — SWAB
1 SWAB, NON-MEDICATED MISCELLANEOUS DAILY
Status: DISCONTINUED | OUTPATIENT
Start: 2020-05-12 | End: 2020-05-13 | Stop reason: HOSPADM

## 2020-05-12 RX ORDER — ZOLPIDEM TARTRATE 5 MG/1
5 TABLET ORAL
Status: DISCONTINUED | OUTPATIENT
Start: 2020-05-12 | End: 2020-05-13 | Stop reason: HOSPADM

## 2020-05-12 RX ORDER — DIPHENHYDRAMINE HCL 25 MG
25 CAPSULE ORAL
Status: DISCONTINUED | OUTPATIENT
Start: 2020-05-12 | End: 2020-05-13 | Stop reason: HOSPADM

## 2020-05-12 RX ORDER — ACETAMINOPHEN 325 MG/1
650 TABLET ORAL
Status: DISCONTINUED | OUTPATIENT
Start: 2020-05-12 | End: 2020-05-13 | Stop reason: HOSPADM

## 2020-05-12 RX ORDER — SIMETHICONE 80 MG
80 TABLET,CHEWABLE ORAL
Status: DISCONTINUED | OUTPATIENT
Start: 2020-05-12 | End: 2020-05-13 | Stop reason: HOSPADM

## 2020-05-12 RX ORDER — OXYCODONE AND ACETAMINOPHEN 5; 325 MG/1; MG/1
1 TABLET ORAL
Status: DISCONTINUED | OUTPATIENT
Start: 2020-05-12 | End: 2020-05-13 | Stop reason: HOSPADM

## 2020-05-12 RX ORDER — NALOXONE HYDROCHLORIDE 0.4 MG/ML
0.4 INJECTION, SOLUTION INTRAMUSCULAR; INTRAVENOUS; SUBCUTANEOUS AS NEEDED
Status: DISCONTINUED | OUTPATIENT
Start: 2020-05-12 | End: 2020-05-13 | Stop reason: HOSPADM

## 2020-05-12 RX ORDER — HYDROCORTISONE ACETATE PRAMOXINE HCL 2.5; 1 G/100G; G/100G
CREAM TOPICAL AS NEEDED
Status: DISCONTINUED | OUTPATIENT
Start: 2020-05-12 | End: 2020-05-13 | Stop reason: HOSPADM

## 2020-05-12 RX ORDER — OXYTOCIN/0.9 % SODIUM CHLORIDE 20/1000 ML
125-500 PLASTIC BAG, INJECTION (ML) INTRAVENOUS ONCE
Status: ACTIVE | OUTPATIENT
Start: 2020-05-12 | End: 2020-05-12

## 2020-05-12 RX ADMIN — Medication 1 TABLET: at 11:39

## 2020-05-12 NOTE — PROGRESS NOTES
Bedside and Verbal shift change report given to MILADIS Chavira RN (oncoming nurse) by Mere Simeon. Ran Alvares RN (offgoing nurse). Report included the following information SBAR, Kardex, Intake/Output and MAR.

## 2020-05-12 NOTE — ROUTINE PROCESS
Bedside and Verbal shift change report given to Brittany Dowd RN (oncoming nurse) by Alicia Matta RN (offgoing nurse). Report included the following information SBAR, Kardex and MAR.

## 2020-05-12 NOTE — PROGRESS NOTES
SBAR IN Report: Mother    Verbal report received from SHANTA Trevino RN on this patient, who is now being transferred from L&D (unit) for routine progression of care. Report consisted of patient's Situation, Background, Assessment and Recommendations (SBAR).  ID bands were compared with the identification form, and verified with the patient and transferring nurse. Information from the SBAR, Kardex, Intake/Output and MAR and the Jackson Report was reviewed with the transferring nurse; opportunity for questions and clarification provided.

## 2020-05-12 NOTE — PROGRESS NOTES
[facility] Alejandra Perez 33   Office [phone], Fax [phone]                                Post-Partum Day Number 1 Progress Note    Patient doing well post-partum without significant complaint. Pain well controlled. Lochia minimal. Unable to void overnight requiring straight cath. Denies headaches, chest pain, SOB, nausea, vomiting, or lower extremity tenderness. Vitals:    Patient Vitals for the past 8 hrs:   BP Temp Pulse Resp   20 0530 95/58 98.6 °F (37 °C) 83 18   20 0515 93/55 99.1 °F (37.3 °C) 86 17   20 2354 98/59  88    20 2339 99/57  86    20 2325 96/51  87      Temp (24hrs), Av.4 °F (36.9 °C), Min:98 °F (36.7 °C), Max:99.1 °F (37.3 °C)      Exam:  Patient without distress. CTAB, no w/r/r/c.               RRR, +S1 and S2, no m/r/g. Abdomen soft, fundus firm at level of umbilicus, appropriately tender. Lower extremities:  No edema. No palpable cords or tenderness.     Lab/Data Review:  Recent Results (from the past 24 hour(s))   AMB POC URINALYSIS DIP STICK AUTO W/O MICRO    Collection Time: 20  9:04 AM   Result Value Ref Range    Color (UA POC) Yellow     Clarity (UA POC) Slightly Cloudy     Glucose (UA POC) Negative Negative    Bilirubin (UA POC) Negative Negative    Ketones (UA POC) Negative Negative    Specific gravity (UA POC) 1.020 1.001 - 1.035    Blood (UA POC) Negative Negative    pH (UA POC) 5.5 4.6 - 8.0    Protein (UA POC) Negative Negative    Urobilinogen (UA POC) 0.2 mg/dL 0.2 - 1    Nitrites (UA POC) Negative Negative    Leukocyte esterase (UA POC) 1+ Negative   CBC W/O DIFF    Collection Time: 20 10:57 AM   Result Value Ref Range    WBC 10.6 3.6 - 11.0 K/uL    RBC 4.76 3.80 - 5.20 M/uL    HGB 13.6 11.5 - 16.0 g/dL    HCT 42.6 35.0 - 47.0 %    MCV 89.5 80.0 - 99.0 FL    MCH 28.6 26.0 - 34.0 PG    MCHC 31.9 30.0 - 36.5 g/dL    RDW 13.6 11.5 - 14.5 %    PLATELET 450 913 - 065 K/uL MPV 10.5 8.9 - 12.9 FL    NRBC 0.0 0  WBC    ABSOLUTE NRBC 0.00 0.00 - 0.01 K/uL   SAMPLES BEING HELD    Collection Time: 20 10:57 AM   Result Value Ref Range    SAMPLES BEING HELD 1BLU     COMMENT        Add-on orders for these samples will be processed based on acceptable specimen integrity and analyte stability, which may vary by analyte. CBC WITH AUTOMATED DIFF    Collection Time: 20  2:46 PM   Result Value Ref Range    WBC 9.6 3.6 - 11.0 K/uL    RBC 4.45 3.80 - 5.20 M/uL    HGB 13.4 11.5 - 16.0 g/dL    HCT 38.7 35.0 - 47.0 %    MCV 87.0 80.0 - 99.0 FL    MCH 30.1 26.0 - 34.0 PG    MCHC 34.6 30.0 - 36.5 g/dL    RDW 13.0 11.5 - 14.5 %    PLATELET 841 430 - 327 K/uL    MPV 10.4 8.9 - 12.9 FL    NRBC 0.0 0  WBC    ABSOLUTE NRBC 0.00 0.00 - 0.01 K/uL    NEUTROPHILS 79 (H) 32 - 75 %    LYMPHOCYTES 14 12 - 49 %    MONOCYTES 5 5 - 13 %    EOSINOPHILS 1 0 - 7 %    BASOPHILS 0 0 - 1 %    IMMATURE GRANULOCYTES 1 (H) 0.0 - 0.5 %    ABS. NEUTROPHILS 7.7 1.8 - 8.0 K/UL    ABS. LYMPHOCYTES 1.4 0.8 - 3.5 K/UL    ABS. MONOCYTES 0.5 0.0 - 1.0 K/UL    ABS. EOSINOPHILS 0.1 0.0 - 0.4 K/UL    ABS. BASOPHILS 0.0 0.0 - 0.1 K/UL    ABS. IMM. GRANS. 0.1 (H) 0.00 - 0.04 K/UL    DF AUTOMATED       Assessment and Plan:    Ghada Evans is a 27 y.o.  s/p  at 40 weeks 1 days. Pregnancy was complicated by obesity, rubella non immmunity, and thrombocytopenia. Postpartum course complicated by PPH. PPD #1 s/p : at 40w1d. Complicated by PPH and vaginal wall laceration w/ friable mucosa, sutures and packing in place.  Continue routine perineal care and maternal education.  Plan discharge tomorrow if no problems occur.  Follow up at [facility] 2 weeks postpartum   Baby boy doing well. Considering circumcision     PPH: . S/p methergine 0.2 mg, misoprostol 800 mcg.   - Continue to monitor for blood loss    Thrombocytopenia: Resolved.  POA plt 192.      Rubella non-immune:   - Vaccination needed postpartum     Hx of depression: stable. Never on meds. - Follow up w/ PCP 2 weeks postpartum     Hx of gallstones: Asymptomatic. On US at [address] [place] [address] on 9/21.     Maternal Obesity: BMI 32. Patient discussed with Dr. Елена Barillas.                  Damian Milligan DO  Family Medicine Resident

## 2020-05-12 NOTE — L&D DELIVERY NOTE
This patient was 30 or more weeks gestation at the time of [facility] go-live. For complete information pertaining to this patient's pregnancy, please refer to the paper chart and ACOG form. Delivery Note    Obstetrician:  Kurt Artis MD    Assistant: none    Pre-Delivery Diagnosis: Term pregnancy    Post-Delivery Diagnosis: Living  infant(s) and Male    Intrapartum Event: None    Procedure: Spontaneous vaginal delivery    Epidural: YES    Monitor:  Fetal Heart Tones - External    Indications for instrumental delivery: none    Estimated Blood Loss: 600, Pt has brisk immediate PPH of 600 cc treated with Methergine and cytotec Pr with success    Episiotomy: n/a    Laceration(s):  Right and left vaginal side wall tears repaired with 3.0 vicryl. Vaginal mucosa very friable, lacerations superficial, many figure of eight stiches placed for hemostasis.  Vaginal packing placed for additional hemostatis     Laceration(s) repair: YES    Presentation: Cephalic    Fetal Description: castillo    Fetal Position: Left Occiput Anterior    Birth Weight: pending    Birth Length: pending    Apgar - One Minute: 9    Apgar - Five Minutes: 9    Umbilical Cord: Nuchal Cord x  1    Specimens: placenta, discarded           Complications:  PPH, as above, friable vaginal mucosa as above, vaginal packing placed               Prenatal Labs:  O+  Attending Attestation: I performed the procedure    Signed By:  Kurt Artis MD     May 11, 2020

## 2020-05-12 NOTE — ROUTINE PROCESS
Bedside and Verbal shift change report given to [employer] and Verbal shift change report given to ,rn (oncoming nurse) by MILADIS Carter (offgoing nurse). Report given with SBAR, Kardex, Intake/Output and MAR.

## 2020-05-12 NOTE — PROGRESS NOTES
HgB 13.6, plt 192 but may be inaccurate. Patient admitted to L&D so will not repeat labs at this time. Forwarding result to [facility] resident.

## 2020-05-12 NOTE — DISCHARGE SUMMARY
[address] [place] [address]   Office [phone], Fax [phone]      Obstetrical Discharge Summary     Name: Ya Lepe MRN: [id number]  SSN: [id number]    YOB: 1990  Age: 27 y.o. Sex: female      Admit Date: 2020    Discharge Date: 2020     Admitting Physician: Chucky Holter, MD     Attending Physician:  Molly Cervantes DO     Admission Diagnoses: Pregnancy [Z34.90]  Normal labor [O80, Z37.9]    Discharge Diagnoses:   Information for the patient's :  Kaylie Melgar, Male [place] [[id number]]   Delivery of a 2.73 kg male infant via Vaginal, Spontaneous on 2020 at 11:03 PM  by Kailee Uriarte. Apgars were 9  and 9 .        Additional Diagnoses:   Hospital Problems  Date Reviewed: 2020          Codes Class Noted POA    Pregnancy ICD-10-CM: Z34.90  ICD-9-CM: V22.2  2020 Unknown        Normal labor ICD-10-CM: O80, Z37.9  ICD-9-CM: 098  2020 Unknown           No results found for: RUBELLAEXT, GRBSEXT, RUBELLAEXT, GRBSEXT    Immunization(s):   Immunization History   Administered Date(s) Administered    Influenza Vaccine (Quad) PF 10/18/2019    Tdap 2020        Post-Partum Depression Screening:  [place]  Depression Scale  I have been able to laugh and see the funny side of things: As much as I always could  I have looked forward with enjoyment to things: As much as I ever did  I have blamed myself unnecessarily when things went wrong: Not very often  I have been anxious or worried for no good reason: No, not at all  I have felt scared or panicky for no very good reason: No, not at all  Things have been getting on top of me: No, most of the time I have coped quite well  I have been so unhappy that I have had difficulty sleeping: No, not at all  I have felt sad or miserable: No, not at all  I have been so unhappy that I have been crying: No, never  The thought of harming myself has occurred to me: Never  Total Score: 2    Hospital Course:   27 y.o.  s/p  at 40w1d days presented for SROM. Pregnancy complicated by obesity, rubella non-immmunity, and thrombocytopenia. Labor was uncomplicated. Delivered TLMI wthout complications. Postpartum course complicated by PPH w/ QBL of 650cc and vaginal wall laceration w/ friable mucosa. Vaginal wall tears repaired and packing placed for hemostasis. Packing removed after 12 hours w/ no complications. On day of discharge, patient is doing well postpartum and has no significant complaints. Pain well-controlled on Motrin 800 mg q8 hrs. Lochia minimal. Tolerating diet, ambulating, and voiding without difficulty. Denies fever, headache, CP, SOB, N/V, or LE pain. Patient is breastfeeding and formula-feeding and baby is doing well. Prenatal Labs: O+, Ab screen neg, hgb fract wnl. Rubella non-immune. VZV immune. HepBsAg, HIV, RPR neg. GC/C neg. Pap NILM. 1hr GTT elevated; 3hr GTT wnl, HgbA1c 4.8, GBS neg. Please address the following at the postpartum follow up visit:    : at 40w1d on 2020. Complicated by PPH and vaginal wall laceration w/ friable mucosa (repaired). - Continue routine perineal care  - Continue Motrin 800 mg q8 hrs prn for pain  - Follow up outpatient 2 weeks postpartum    PPH: . S/p methergine 0.2 mg, misoprostol 800 mcg. Post-delivery Hgb 11.8. Rubella non-immune:   - Vaccination needed postpartum    Hx of depression: stable. Never on meds. - Follow up w/ PCP 2 weeks postpartum    Thrombocytopenia: Resolved. POA plt 192.      Hx of gallstones: Asymptomatic. On US at [address] [place] [address] on .     Maternal Obesity: BMI 32.     Visit Vitals  /70 (BP 1 Location: Left arm, BP Patient Position: Sitting)   Pulse 85   Temp 98.2 °F (36.8 °C)   Resp 18   SpO2 97%   Breastfeeding Yes        Physical Exam: Patient without distress              CTAB no w/r/r/c              RRR, +S1 and S2, no m/r/g              Abdomen soft, fundus firm below the level of umbilicus, appropriately tender. Lower extremities are negative for swelling, cords, or tenderness. Condition at Discharge:  stable    Disposition:  home    Patient Instructions:   Current Discharge Medication List      CONTINUE these medications which have NOT CHANGED    Details   prenatal DKXR81/KHUV fum/folic (PRENATAL VITAMIN) 27 mg iron- 800 mcg tab tablet TK 1 T PO  D  Refills: 0             Follow-up Care/Patient Instructions:  Nothing in the vagina for 6 weeks. Activity: activity as tolerated  Diet: regular  Wound Care: as directed       Reference my discharge instructions.     Follow-up Information     Follow up With Specialties Details Why Contact Info    Ami Carter MD Family Practice Go on 5/26/2020 8:30 AM for postpartum follow up.  [address] [place] [address]  [phone]            Signed By:  Alejandra Willett DO    Family Medicine Resident

## 2020-05-12 NOTE — LACTATION NOTE
This note was copied from a baby's chart. Discussed with mother her plan for feeding. Reviewed the benefits of exclusive breast milk feeding during the hospital stay. Informed her of the risks of using formula to supplement in the first few days of life as well as the benefits of successful breast milk feeding; referred her to the Breastfeeding booklet about this information. She acknowledges understanding of information reviewed and states that it is her plan to breast and bottlefeed her infant. Will support her choice and offer additional information as needed. Pt will successfully establish breastfeeding by feeding in response to early feeding cues   or wake every 3h, will obtain deep latch, and will keep log of feedings/output. Taught to BF at hunger cues and or q 2-3 hrs and to offer 10-20 drops of hand expressed colostrum at any non-feeds. Breast Assessment  Left Breast: Small , Wide angle  Left Nipple: Everted, Intact  Right Breast: Small , Wide angle  Right Nipple: Everted, Intact  Breast- Feeding Assessment  Attends Breast-Feeding Classes: No  Breast-Feeding Experience: No  Breast Trauma/Surgery: No  Type/Quality: Attempted  Lactation Consultant Visits  Breast-Feedings: Attempted breast-feeding  Mother/Infant Observation  Mother Observation: Holds breast  Infant Observation: Frenulum checked  LATCH Documentation  Latch: Too sleepy or reluctant, no latch achieved  Audible Swallowing: None  Type of Nipple: Everted (after stimulation)  Comfort (Breast/Nipple): Soft/non-tender  Hold (Positioning): Full assist, teach one side, mother does other, staff holds  LATCH Score: 5  Reviewed breastfeeding basics:  How milk is made and normal  breastfeeding behaviors discussed. Supply and demand,  stomach size, early feeding cues, skin to skin bonding with comfortable positioning and baby led latch-on reviewed.   How to identify signs of successful breastfeeding sessions reviewed; education on assymetrical latch, signs of effective latching vs shallow, in-effective latching, normal  feeding frequency and duration and expected infant output discussed. Normal course of breastfeeding discussed including the AAP's recommendation that children receive exclusive breast milk feedings for the first six months of life with breast milk feedings to continue through the first year of life and/or beyond as complimentary table foods are added. Breastfeeding Booklet and Warm line information provided with discussion. Discussed typical  weight loss and the importance of pediatrician appointment within 24-48 hours of discharge, at 2 weeks of life and normalcy of requesting pediatric weight checks as needed in between visits. Placed baby in rpone position, baby latched but did not suck. Parents fed baby a lot of fomrula. Explained to parents importance of not overfeeding baby.   Baby fed 12 drops of colostrum

## 2020-05-13 VITALS
RESPIRATION RATE: 16 BRPM | HEART RATE: 82 BPM | SYSTOLIC BLOOD PRESSURE: 98 MMHG | TEMPERATURE: 97.8 F | OXYGEN SATURATION: 97 % | DIASTOLIC BLOOD PRESSURE: 57 MMHG

## 2020-05-13 PROCEDURE — 90707 MMR VACCINE SC: CPT | Performed by: FAMILY MEDICINE

## 2020-05-13 PROCEDURE — 74011250636 HC RX REV CODE- 250/636: Performed by: FAMILY MEDICINE

## 2020-05-13 RX ORDER — IBUPROFEN 800 MG/1
800 TABLET ORAL EVERY 8 HOURS
Qty: 30 TAB | Refills: 0 | Status: SHIPPED | OUTPATIENT
Start: 2020-05-13 | End: 2021-02-27

## 2020-05-13 RX ADMIN — MEASLES, MUMPS, AND RUBELLA VIRUS VACCINE LIVE 0.5 ML: 1000; 12500; 1000 INJECTION, POWDER, LYOPHILIZED, FOR SUSPENSION SUBCUTANEOUS at 16:39

## 2020-05-13 NOTE — ROUTINE PROCESS
Bedside shift change report given to Alicia Matta RN (oncoming nurse) by Brittany Dowd RN (offgoing nurse). Report included the following information SBAR, Kardex and MAR.

## 2020-05-13 NOTE — LACTATION NOTE
This note was copied from a baby's chart.   Instructed parents to call [facility] when she gets ready to feed

## 2020-05-13 NOTE — ANESTHESIA POSTPROCEDURE EVALUATION
* No procedures listed *.    spinal, epidural    Anesthesia Post Evaluation      Multimodal analgesia: multimodal analgesia not used between 6 hours prior to anesthesia start to PACU discharge  Patient location during evaluation: PACU  Patient participation: complete - patient participated  Level of consciousness: awake  Pain management: adequate  Airway patency: patent  Anesthetic complications: no  Cardiovascular status: acceptable, blood pressure returned to baseline and hemodynamically stable  Respiratory status: acceptable  Hydration status: acceptable  Post anesthesia nausea and vomiting:  controlled      No vitals data found for the desired time range.

## 2020-05-13 NOTE — DISCHARGE INSTRUCTIONS
Patient Discharge Instructions    Chrissie Sheppard / [id number] : 1990    Admitted 2020 Discharged: 2020       Please bring this form with you to show your care provider at your follow-up appointment. Primary care provider:  Melanie Lewis DO    Discharging provider:  Tee Lewis DO  - Family Medicine Resident  Dr. Christian Nieves Attending     ACUTE DIAGNOSES:  Pregnancy [Z34.90]  Normal labor [O80, Z37.9]    FOLLOW-UP CARE RECOMMENDATIONS:    Follow-up Information     Follow up With Specialties Details Why Contact Info    Bradley Lopez MD Family Practice Go on 2020 8:30 AM for postpartum follow up.  [address]  [phone]            Continuing Therapy:  - Please continue Motrin 800 mg, 1 tablet every 8 hours for the next 2 days, then 1 tablet every 8 hours as needed for pain  - Please continue your prenatal vitamins     Follow-up tests needed: none    Pending test results: At the time of your discharge the following test results are still pending: none. Please make sure you review these results with your outpatient follow-up provider(s). Specific symptoms to watch for: chest pain, shortness of breath, fever, chills, nausea, vomiting, diarrhea, change in mentation, falling, weakness, bleeding. DIET/what to eat:  Regular Diet    ACTIVITY:   Activity Instructions    Do not lift anything heavier than your baby for 6 weeks. Nothing in the vagina for 6 weeks. You are ok to drive as long as you are not taking Percocet or other narcotic pain medication (Motrin is ok). I understand that if any problems occur once I am at home I am to contact my physician. These instructions were explained to me and I had the opportunity to ask questions. I understand and acknowledge receipt of the instructions indicated above. Physician's or R.N.'s Signature                                                                  Date/Time                                                                                                                                              Patient or Representative Signature                                                          Date/Time

## 2020-05-13 NOTE — PROGRESS NOTES
5/13/2020  2:29 PM    CM met with MITZY to complete initial assessment and complete discharge planning. MOB verified and confirmed demographics. MITZY lives with FOB/boyfriend- Naila Dodge,  at the address on file. MITZY is not employed and plans to be home with baby. FOB is  employed and will be taking about 2 wks off. MITZY reports she has good family support. MOB plans to breast/bottle feed baby. MOB plans to follow with [facility]. MITZY has car seat, bassinet/crib, clothing, bottles and all necessary supplies for baby. MITZY does not have insurance and has applied for Medicaid. [employer] will help MOB to receive update on status of appl, and add baby to Medicaid case. MOB denied needing [facility] services. CM consult noted for bili blanket, however MITZY is self pay and does not have insurance. MD agreed to keep baby one more night to draw labs for bili and discharge tomorrow AM. CM called [employer] and received cash price/per day for bili blanket, if needed, and was advised it would be $72 dollars/per day and the would bill patient directly. Relayed this info to MITZY and verbalized understanding. Plan is for baby to discharge home 5/14. Care Management Interventions  PCP Verified by CM: Yes([facility])  Mode of Transport at Discharge:  Other (see comment)  Transition of Care Consult (CM Consult): Discharge Planning  Current Support Network: Own Home(Lives with boyfriend-Julien)  Confirm Follow Up Transport: Family  Discharge Location  Discharge Placement: Home with family assistance  Ana Staton

## 2020-05-13 NOTE — ROUTINE PROCESS
Pt going to be discharged from our system but will board with the baby overnight.  Will receive discharge paperwork with baby's in the AM

## 2020-05-26 ENCOUNTER — ROUTINE PRENATAL (OUTPATIENT)
Dept: FAMILY MEDICINE CLINIC | Age: 30
End: 2020-05-26

## 2020-05-26 VITALS
HEIGHT: 62 IN | HEART RATE: 66 BPM | OXYGEN SATURATION: 98 % | SYSTOLIC BLOOD PRESSURE: 101 MMHG | TEMPERATURE: 99.1 F | WEIGHT: 197.8 LBS | RESPIRATION RATE: 16 BRPM | DIASTOLIC BLOOD PRESSURE: 66 MMHG | BODY MASS INDEX: 36.4 KG/M2

## 2020-05-26 NOTE — PROGRESS NOTES
Adalgisa Ro is a 27 y.o. female    Chief Complaint   Patient presents with    Follow-up     Patient is here for follow up on postpartum. She says that sometimes she cries to see the baby crying because sometimes she does not know what is wrong with him. She does not feel like harming the baby and no other concerns. 1. Have you been to the ER, urgent care clinic since your last visit? Hospitalized since your last visit? No  M  2. Have you seen or consulted any other health care providers outside of the [address] [place] [address] since your last visit? Include any pap smears or colon screening. No      Visit Vitals  /66 (BP 1 Location: Left arm, BP Patient Position: Sitting)   Pulse 66   Temp 99.1 °F (37.3 °C) (Oral)   Resp 16   Ht 5' 2\" (1.575 m)   Wt 197 lb 12.8 oz (89.7 kg)   SpO2 98%   BMI 36.18 kg/m²           There are no preventive care reminders to display for this patient. Medication Reconciliation completed, changes noted.   Please  Update medication list.

## 2020-05-26 NOTE — PROGRESS NOTES
Postpartum Note    27 y.o. female status post  at 39w3 presenting for postpartum visit. Patient doing well post-partum without significant complaint. Immunization History   Administered Date(s) Administered    Influenza Vaccine (Quad) PF 10/18/2019    MMR 2020    Tdap 2020       Lochia: normal  Pain: well controlled  Baby: doing well, has seen PCP  Sexual activity: No   Plan for contraception: would like Nexplanon   Symptoms of depression: No   Breast/bottle: Both, pumping and supplementing with formula, feeding every 3 hours   Support from FOB/family: Yes     Pt had MMR vaccine on day of discharge     Vitals:    Visit Vitals  Resp 16   Wt 197 lb 12.8 oz (89.7 kg)   LMP 2019 (Exact Date)   BMI 35.49 kg/m²       Exam:  Patient without distress. Abdomen soft, fundus firm at level of umbilicus, nontender. Lower extremities:  No edema. No palpable cords or tenderness. Post-Partum Depression Screening: Total Score: 4 (scanned in chart)     Assessment and Plan:    Melva Pettit is a 27 y.o.  s/p  at 40 weeks 1 days. Patient having uncomplicated post-partum course. 1. Continue routine care  2. Call clinic or make appointment for symptoms of sadness  3. Follow up 4 weeks for 6 week postpartum visit and contraception discussion, pt would like Nexplanon but unable to afford at this time, would consider starting OCPs.                   Imelda Fermin MD

## 2020-05-26 NOTE — PATIENT INSTRUCTIONS
El período posparto: Instrucciones de cuidadoInstrucciones de cuidado Postpartum: Care Instructions Instrucciones de cuidado Después del parto (período posparto), alfaro cuerpo pasa por muchos cambios. Algunos de estos cambios suceden raven varias semanas. [address], el cuerpo comienza a recuperarse y se prepara para el amamantamiento. Es posible que [address] se sienta sensible. Las hormonas pueden cambiar alfaro estado de ánimo sin advertencia y sin motivo aparente. Raven las primeras [address] después del parto, muchas mujeres tienen emociones que [place] de rivera a michelle. Es posible que le resulte difícil dormir. Es posible que llore mucho. [place] se llama \"melancolía de la maternidad\". Estas emociones abrumadoras suelen desaparecer dentro de unos días o semanas. Marie es importante hablar con alfaro médico acerca de chandrakant sentimientos. Raven las primeras semanas después del [place], es fácil cansarse demasiado o sentirse [place]. No helen demasiados esfuerzos. Descanse cada vez que pueda, acepte la ayuda de los demás, coma calvin y melo abundantes líquidos. En el primer par de Alicia Mendoza de ally a guzman, es posible que alfaro médico o partera deseen verla y hacer un plan para cualquier atención de seguimiento que usted pueda necesitar. Probablemente tendrá Denisse Naranjo geraldine completa de posparto dentro de los primeros 3 meses después del [place]. En maryam momento, alfaro médico o partera revisarán alfaro recuperación del parto. Él o lele también verán cómo está enfrentando usted chandrakant emociones y conversarán sobre chandrakant inquietudes y preguntas. La atención de seguimiento es khari parte clave de alfaro tratamiento y seguridad. Asegúrese de hacer y acudir a todas las citas, y llame a alfaro médico si está teniendo problemas. También es khari buena idea saber los resultados de chandrakant exámenes y mantener khari lista de los medicamentos que pee. Cómo puede cuidarse en el hogar? · [place]a o descanse cuando alfaro bebé lo rebekah. · Si es posible, permita que july familiares o july amigos la ayuden con las tareas del hogar. No intente hacerlo todo usted merle. · Si tiene hemorroides o hinchazón o dolor alrededor de la abertura de la vagina, pruebe aplicarse frío y calor. Puede aplicarse hielo o khari compresa fría en la karlos raven 10 a 20 minutos cada vez. Póngase un paño cuadra entre el hielo y la piel. Además, trate de sentarse en algunos centímetros de agua tibia (baño de asiento) 3 veces al día y después de las evacuaciones. · [employer] analgésicos (medicamentos para el dolor) exactamente según las indicaciones. ? Si el médico le recetó un analgésico, tómelo según las indicaciones. ? Si no está tomando un analgésico recetado, pregúntele a alfaro médico si puede courtney chino de [employer]. · Coma más fibra para evitar el estreñimiento. Incluya alimentos meg panes y cereales integrales, verduras crudas, frutas crudas y secas, y frijoles (habichuelas). · Mary Ann abundantes líquidos, los suficientes meg para que alfaro orina sea de color amarillo andriy o transparente meg el agua. Si tiene [employer], del corazón o del hígado y tiene que Anthony's líquidos, hable con alfaro médico antes de aumentar alfaro consumo. · No se lave el interior de la vagina con líquidos (lavados vaginales). · Si tiene puntos de sutura, mantenga la karlos limpia con agua tibia, ya sea echándola o rociándola sobre la karlos externa de la vagina y el ano después de usar el baño. · Rebekah khari lista de preguntas para hacerle a alfaro médico o partera. July preguntas podrían ser sobre lo siguiente: 
? [employer] senos, meg bultos o sensibilidad. ? Cuándo esperar que regrese alfaro período menstrual. 
? Qué forma de anticoncepción es la más adecuada para usted. ? El peso que aumentó raven el [facility]. ? Las opciones de [address]. ? Marrian Darner y bebidas son mejores para usted, sobre todo si está amamantando. ? Problemas que podría tener con la lactancia. ? Cuándo puede [employer]. Algunas mujeres laisha vez quieran hablar sobre lubricantes vaginales. ? Cualquier sentimiento de tristeza o inquietud que tenga. Cuándo debe pedir ayuda? Llame al 911 en cualquier momento que considere que necesita atención de [place]. Por ejemplo, llame si: 
  · Tiene pensamientos de lastimarse o de hacerle daño a alfaro bebé o a otra persona.  
  · Se desmayó (perdió el conocimiento).  
  · Tiene dolor en el pecho, le falta el aire o tose philip.  
  · Tiene convulsiones.  
 Llame a alfaro médico ahora mismo o busque atención médica de inmediato si: 
  · Alfaro sangrado vaginal parece hacerse más abundante.  
  · Se siente mareada o aturdida, o que está a punto de desmayarse.  
  · Tiene fiebre.  
  · Tiene dolor abdominal nuevo o más intenso.  
  · Tiene síntomas de un coágulo de philip en la pierna (que se llama trombosis venosa profunda), meg: ? Dolor en la pantorrilla, el muslo, la svetlana o detrás de la rodilla. ? Enrojecimiento e hinchazón en la pierna o la svetlana.  
  · Tiene señales de preeclampsia, meg: 
? Hinchazón repentina de la sandro, las hemalatha o los pies. ? Nuevos problemas de la vista (meg oscurecimiento, bertrand borroso o bertrand puntos). ? Dolor de mariana intenso.  
 Preste especial atención a los cambios en alfaro yvette y asegúrese de comunicarse con alfaro médico si: 
  · Tiene nuevo flujo vaginal o hi empeora.  
  · Se siente michelle o deprimida.  
  · Tiene problemas con los senos o el amamantamiento. Dónde puede encontrar más información en inglés? Georginaona Silvia a [email or web address]-leigha.info/ Arun So [address] en la búsqueda para aprender más acerca de \"El período posparto: Instrucciones de cuidadoInstrucciones de cuidado. \" Revisado: 29 mayo, 2019Versión del contenido: 12.4 © 0790-6599 Healthwise, Incorporated.  
Las instrucciones de cuidado fueron adaptadas bajo licencia por [employer] (which disclaims liability or warranty for this information). Si usted tiene [place] afección médica o sobre estas instrucciones, siempre pregunte a alfaro profesional de yvette. [facility] niega toda garantía o responsabilidad por alfaro uso de esta información.

## 2020-06-26 ENCOUNTER — VIRTUAL VISIT (OUTPATIENT)
Dept: FAMILY MEDICINE CLINIC | Age: 30
End: 2020-06-26

## 2020-06-26 DIAGNOSIS — N92.6 IRREGULAR MENSES: Primary | ICD-10-CM

## 2020-06-26 PROBLEM — Z34.90 PREGNANCY: Status: RESOLVED | Noted: 2020-05-11 | Resolved: 2020-06-26

## 2020-06-26 PROBLEM — Z37.9 NORMAL LABOR: Status: RESOLVED | Noted: 2020-05-11 | Resolved: 2020-06-26

## 2020-06-26 RX ORDER — NORGESTIMATE AND ETHINYL ESTRADIOL 0.25-0.035
1 KIT ORAL DAILY
Qty: 3 DOSE PACK | Refills: 3 | Status: SHIPPED | OUTPATIENT
Start: 2020-06-26

## 2020-06-26 NOTE — PROGRESS NOTES
Bennie Donis  30 y.o. female  1990  [address] [address]  [id number]    [phone] (home)      [address]:    Telephone Encounter  Nicolle Cavazos MD       Encounter Date: 2020 at 8:46 AM    Consent: Bennie Donis, who was seen by synchronous (real-time) audio only technology, and/or her healthcare decision maker, is aware that this patient-initiated, Telehealth encounter on 2020 is a billable service, with coverage as determined by her insurance carrier. She is aware that she may receive a bill and has provided verbal consent to proceed: Yes. [employer] : [id number]     Chief Complaint   Patient presents with    Irregular Menses    Post-Partum Care       History of Present Illness   Bennie Donis is a 27 y.o. female was evaluated by telephone. I communicated with the patient about 6wk postpartum check + irregular menses. 6wks Postpartum  27 y.o. female status post  at 40w1d presenting for postpartum visit. Patient doing well post-partum without significant complaint. Lochia: normal  Pain: none  Baby: doing well, has seen PCP  Sexual activity: no  Plan for contraception: would like to start OCP to manage irregular menses  Symptoms of depression: no (prev [place] scale last month- 4)  Breast/bottle: formula feeding mostly and minimal breastfeeding   Support from FOB/family: yes    Review of Systems   Review of Systems   Gastrointestinal: Negative for abdominal pain. Psychiatric/Behavioral: Negative for depression. The patient is not nervous/anxious. Vitals/Objective:   General: Patient speaking in complete sentences without effort. Normal speech and cooperative. Due to this being a Virtual Check-in/Telephone evaluation, many elements of the physical examination are unable to be assessed. Assessment and Plan:   Time-based coding, delete if not needed:  I spent at least 15 minutes with this established patient, and >50% of the time was spent counseling and/or coordinating care regarding 6wk post-partum check + irregular menses  Total Time: minutes: 11-20 minutes    Ya Lepe is a 27 y.o.  s/p  at 40 weeks 1 days. Patient having uncomplicated post-partum course. 1. Will start sprintec for management of irregular menses but pt also inquiring about nexplanon. Given that she has no insurance, advised her to go to [facility] to try and get it at reduced cost/?free of charge  2. Continue routine care  3. Call clinic or make appointment for symptoms of sadness  4. Follow up for yearly well woman exam.      We discussed the expected course, resolution and complications of the diagnosis(es) in detail. Medication risks, benefits, costs, interactions, and alternatives were discussed as indicated. I advised her to contact the office if her condition worsens, changes or fails to improve as anticipated. She expressed understanding with the diagnosis(es) and plan. Patient understands that this encounter was a temporary measure, and the importance of further follow up and examination was emphasized. Patient verbalized understanding. Patient informed to follow up: yearly well woman exam    I affirm this is a Patient Initiated Episode with an Established Patient who has not had a related appointment within my department in the past 7 days or scheduled within the next 24 hours. Note: not billable if this call serves to triage the patient into an appointment for the relevant concern    Pt discussed w/ Dr. Lurdes Santana, Family Medicine Attending    Electronically Signed: Neris Null MD, Family Medicine Resident  Providers location when delivering service: home    CPT:  57076 (5-10 minutes)  () [phone] (11-20 minutes)  21  (21-30 minutes)    Medicare:  [address]      ICD-10-CM ICD-9-CM    1.  Irregular menses N92.6 626.4 norgestimate-ethinyl estradioL (ORTHO-CYCLEN, SPRINTEC) 0.25-35 mg-mcg tab   2.  (spontaneous vaginal delivery) O80 650 norgestimate-ethinyl estradioL (ORTHO-CYCLEN, [facility]) 0.25-35 mg-mcg tab       Pursuant to the emergency declaration under the [facility] [place] [address] waiver authority and the [employer] Act, this Virtual  Visit was conducted, with patient's consent, to reduce the patient's risk of exposure to COVID-19 and provide continuity of care for an established patient. History   Patients past medical, surgical and family histories were personally reviewed and updated. Past Medical History:   Diagnosis Date    Depression     never on Rx, was depressed when her mother  9 yr ago   [address] [place] [address] Gallstones      No past surgical history on file. No family history on file.   Social History     Socioeconomic History    Marital status: SINGLE     Spouse name: Not on file    Number of children: Not on file    Years of education: Not on file    Highest education level: Not on file   Occupational History    Not on file   Social Needs    Financial resource strain: Not on file    Food insecurity     Worry: Not on file     Inability: Not on file    Transportation needs     Medical: Not on file     Non-medical: Not on file   Tobacco Use    Smoking status: Never Smoker    Smokeless tobacco: Never Used   Substance and Sexual Activity    Alcohol use: Not Currently    Drug use: Never    Sexual activity: Not Currently   Lifestyle    Physical activity     Days per week: Not on file     Minutes per session: Not on file    Stress: Not on file   Relationships    Social connections     Talks on phone: Not on file     Gets together: Not on file     Attends Yazidi service: Not on file     Active member of club or organization: Not on file     Attends meetings of clubs or organizations: Not on file     Relationship status: Not on file    Intimate partner violence     Fear of current or ex partner: Not on file     Emotionally abused: Not on file     Physically abused: Not on file     Forced sexual activity: Not on file   Other Topics Concern    Not on file   Social History Narrative    Not on file            Current Medications/Allergies   Medications and Allergies reviewed:    Current Outpatient Medications   Medication Sig Dispense Refill    norgestimate-ethinyl estradioL (ORTHO-CYCLEN, [facility]) 0.25-35 mg-mcg tab Take 1 Tab by mouth daily. 3 Dose Pack 3    ibuprofen (MOTRIN) 800 mg tablet Take 1 Tab by mouth every eight (8) hours.  30 Tab 0    prenatal MKLM51/XTQM fum/folic (PRENATAL VITAMIN) 27 mg iron- 800 mcg tab tablet TK 1 T PO  D  0     No Known Allergies

## 2020-06-30 NOTE — PROGRESS NOTES
[address] Medicine Residency Attending Addendum:  Dr. Anatoly Juarez MD,  the patient and I were not physically present during this encounter. The resident and I are concurrently monitoring the patient care through appropriate telecommunication technology. I discussed the findings, assessment and plan with the resident and agree with the resident's findings and plan as documented in the resident's note.       Rhonda Pedroza MD

## 2021-02-27 ENCOUNTER — HOSPITAL ENCOUNTER (OUTPATIENT)
Age: 31
Setting detail: OBSERVATION
Discharge: HOME | End: 2021-02-28
Attending: EMERGENCY MEDICINE | Admitting: SURGERY

## 2021-02-27 ENCOUNTER — APPOINTMENT (OUTPATIENT)
Dept: GENERAL RADIOLOGY | Age: 31
End: 2021-02-27
Attending: SURGERY

## 2021-02-27 ENCOUNTER — ANESTHESIA EVENT (OUTPATIENT)
Dept: SURGERY | Age: 31
End: 2021-02-27

## 2021-02-27 ENCOUNTER — ANESTHESIA (OUTPATIENT)
Dept: SURGERY | Age: 31
End: 2021-02-27

## 2021-02-27 ENCOUNTER — APPOINTMENT (OUTPATIENT)
Dept: ULTRASOUND IMAGING | Age: 31
End: 2021-02-27
Attending: NURSE PRACTITIONER

## 2021-02-27 DIAGNOSIS — R10.11 ABDOMINAL PAIN, RIGHT UPPER QUADRANT: ICD-10-CM

## 2021-02-27 DIAGNOSIS — K81.0 ACUTE CHOLECYSTITIS: Primary | ICD-10-CM

## 2021-02-27 DIAGNOSIS — G89.18 POSTOPERATIVE PAIN: ICD-10-CM

## 2021-02-27 DIAGNOSIS — K80.80 BILIARY CALCULUS OF OTHER SITE WITHOUT OBSTRUCTION: ICD-10-CM

## 2021-02-27 LAB
ALBUMIN SERPL-MCNC: 4.4 G/DL (ref 3.5–5)
ALBUMIN/GLOB SERPL: 1 {RATIO} (ref 1.1–2.2)
ALP SERPL-CCNC: 68 U/L (ref 45–117)
ALT SERPL-CCNC: 24 U/L (ref 12–78)
ANION GAP SERPL CALC-SCNC: 6 MMOL/L (ref 5–15)
APPEARANCE UR: CLEAR
AST SERPL-CCNC: 16 U/L (ref 15–37)
BACTERIA URNS QL MICRO: NEGATIVE /HPF
BASOPHILS # BLD: 0 K/UL (ref 0–0.1)
BASOPHILS NFR BLD: 1 % (ref 0–1)
BILIRUB SERPL-MCNC: 0.9 MG/DL (ref 0.2–1)
BILIRUB UR QL: NEGATIVE
BUN SERPL-MCNC: 11 MG/DL (ref 6–20)
BUN/CREAT SERPL: 15 (ref 12–20)
CALCIUM SERPL-MCNC: 9.5 MG/DL (ref 8.5–10.1)
CHLORIDE SERPL-SCNC: 108 MMOL/L (ref 97–108)
CO2 SERPL-SCNC: 25 MMOL/L (ref 21–32)
COLOR UR: ABNORMAL
COMMENT, HOLDF: NORMAL
COVID-19 RAPID TEST, COVR: NOT DETECTED
CREAT SERPL-MCNC: 0.73 MG/DL (ref 0.55–1.02)
DIFFERENTIAL METHOD BLD: NORMAL
EOSINOPHIL # BLD: 0.1 K/UL (ref 0–0.4)
EOSINOPHIL NFR BLD: 1 % (ref 0–7)
EPITH CASTS URNS QL MICRO: ABNORMAL /LPF
ERYTHROCYTE [DISTWIDTH] IN BLOOD BY AUTOMATED COUNT: 13.4 % (ref 11.5–14.5)
GLOBULIN SER CALC-MCNC: 4.4 G/DL (ref 2–4)
GLUCOSE SERPL-MCNC: 74 MG/DL (ref 65–100)
GLUCOSE UR STRIP.AUTO-MCNC: NEGATIVE MG/DL
HCG UR QL: NEGATIVE
HCT VFR BLD AUTO: 40.9 % (ref 35–47)
HGB BLD-MCNC: 14.1 G/DL (ref 11.5–16)
HGB UR QL STRIP: ABNORMAL
IMM GRANULOCYTES # BLD AUTO: 0 K/UL (ref 0–0.04)
IMM GRANULOCYTES NFR BLD AUTO: 0 % (ref 0–0.5)
KETONES UR QL STRIP.AUTO: 15 MG/DL
LEUKOCYTE ESTERASE UR QL STRIP.AUTO: ABNORMAL
LIPASE SERPL-CCNC: 107 U/L (ref 73–393)
LYMPHOCYTES # BLD: 1.5 K/UL (ref 0.8–3.5)
LYMPHOCYTES NFR BLD: 25 % (ref 12–49)
MCH RBC QN AUTO: 29.9 PG (ref 26–34)
MCHC RBC AUTO-ENTMCNC: 34.5 G/DL (ref 30–36.5)
MCV RBC AUTO: 86.7 FL (ref 80–99)
MONOCYTES # BLD: 0.4 K/UL (ref 0–1)
MONOCYTES NFR BLD: 7 % (ref 5–13)
NEUTS SEG # BLD: 3.9 K/UL (ref 1.8–8)
NEUTS SEG NFR BLD: 66 % (ref 32–75)
NITRITE UR QL STRIP.AUTO: NEGATIVE
NRBC # BLD: 0 K/UL (ref 0–0.01)
NRBC BLD-RTO: 0 PER 100 WBC
PH UR STRIP: 5.5 [PH] (ref 5–8)
PLATELET # BLD AUTO: 168 K/UL (ref 150–400)
PMV BLD AUTO: 11.1 FL (ref 8.9–12.9)
POTASSIUM SERPL-SCNC: 3.6 MMOL/L (ref 3.5–5.1)
PROT SERPL-MCNC: 8.8 G/DL (ref 6.4–8.2)
PROT UR STRIP-MCNC: NEGATIVE MG/DL
RBC # BLD AUTO: 4.72 M/UL (ref 3.8–5.2)
RBC #/AREA URNS HPF: ABNORMAL /HPF (ref 0–5)
SAMPLES BEING HELD,HOLD: NORMAL
SARS-COV-2, COV2: NORMAL
SODIUM SERPL-SCNC: 139 MMOL/L (ref 136–145)
SOURCE, COVRS: NORMAL
SP GR UR REFRACTOMETRY: <1.005 (ref 1–1.03)
UR CULT HOLD, URHOLD: NORMAL
UROBILINOGEN UR QL STRIP.AUTO: 0.2 EU/DL (ref 0.2–1)
WBC # BLD AUTO: 5.9 K/UL (ref 3.6–11)
WBC URNS QL MICRO: ABNORMAL /HPF (ref 0–4)

## 2021-02-27 PROCEDURE — 74011000250 HC RX REV CODE- 250: Performed by: SURGERY

## 2021-02-27 PROCEDURE — 77030008771 HC TU NG SALEM SUMP -A: Performed by: NURSE ANESTHETIST, CERTIFIED REGISTERED

## 2021-02-27 PROCEDURE — 36415 COLL VENOUS BLD VENIPUNCTURE: CPT

## 2021-02-27 PROCEDURE — 99218 HC RM OBSERVATION: CPT

## 2021-02-27 PROCEDURE — 74011250637 HC RX REV CODE- 250/637: Performed by: SURGERY

## 2021-02-27 PROCEDURE — 77030013079 HC BLNKT BAIR HGGR 3M -A: Performed by: NURSE ANESTHETIST, CERTIFIED REGISTERED

## 2021-02-27 PROCEDURE — 81001 URINALYSIS AUTO W/SCOPE: CPT

## 2021-02-27 PROCEDURE — 47563 LAPARO CHOLECYSTECTOMY/GRAPH: CPT | Performed by: SURGERY

## 2021-02-27 PROCEDURE — 77030008684 HC TU ET CUF COVD -B: Performed by: NURSE ANESTHETIST, CERTIFIED REGISTERED

## 2021-02-27 PROCEDURE — 77030020053 HC ELECTRD LAPSCP COVD -B: Performed by: SURGERY

## 2021-02-27 PROCEDURE — 77030012770 HC TRCR OPT FX AMR -B: Performed by: SURGERY

## 2021-02-27 PROCEDURE — 77030037032 HC INSRT SCIS CLICKLLINE DISP STOR -B: Performed by: SURGERY

## 2021-02-27 PROCEDURE — 74011250636 HC RX REV CODE- 250/636: Performed by: NURSE ANESTHETIST, CERTIFIED REGISTERED

## 2021-02-27 PROCEDURE — 74300 X-RAY BILE DUCTS/PANCREAS: CPT

## 2021-02-27 PROCEDURE — 88304 TISSUE EXAM BY PATHOLOGIST: CPT

## 2021-02-27 PROCEDURE — 76705 ECHO EXAM OF ABDOMEN: CPT

## 2021-02-27 PROCEDURE — 2709999900 HC NON-CHARGEABLE SUPPLY: Performed by: SURGERY

## 2021-02-27 PROCEDURE — 76060000033 HC ANESTHESIA 1 TO 1.5 HR: Performed by: SURGERY

## 2021-02-27 PROCEDURE — 77030031139 HC SUT VCRL2 J&J -A: Performed by: SURGERY

## 2021-02-27 PROCEDURE — 77030004813 HC CATH CHOLGM TELE -B: Performed by: SURGERY

## 2021-02-27 PROCEDURE — 96374 THER/PROPH/DIAG INJ IV PUSH: CPT

## 2021-02-27 PROCEDURE — 77030018875 HC APPL CLP LIG4 J&J -B: Performed by: SURGERY

## 2021-02-27 PROCEDURE — 80053 COMPREHEN METABOLIC PANEL: CPT

## 2021-02-27 PROCEDURE — 77030026438 HC STYL ET INTUB CARD -A: Performed by: NURSE ANESTHETIST, CERTIFIED REGISTERED

## 2021-02-27 PROCEDURE — 87635 SARS-COV-2 COVID-19 AMP PRB: CPT

## 2021-02-27 PROCEDURE — 85025 COMPLETE CBC W/AUTO DIFF WBC: CPT

## 2021-02-27 PROCEDURE — 83690 ASSAY OF LIPASE: CPT

## 2021-02-27 PROCEDURE — 77030020829: Performed by: SURGERY

## 2021-02-27 PROCEDURE — 74011250636 HC RX REV CODE- 250/636: Performed by: NURSE PRACTITIONER

## 2021-02-27 PROCEDURE — 77030035029 HC NDL INSUF VERES DISP COVD -B: Performed by: SURGERY

## 2021-02-27 PROCEDURE — 76210000063 HC OR PH I REC FIRST 0.5 HR: Performed by: SURGERY

## 2021-02-27 PROCEDURE — 76010000149 HC OR TIME 1 TO 1.5 HR: Performed by: SURGERY

## 2021-02-27 PROCEDURE — 77030009851 HC PCH RTVR ENDOSC AMR -B: Performed by: SURGERY

## 2021-02-27 PROCEDURE — 74011000250 HC RX REV CODE- 250: Performed by: NURSE ANESTHETIST, CERTIFIED REGISTERED

## 2021-02-27 PROCEDURE — 77030018836 HC SOL IRR NACL ICUM -A: Performed by: SURGERY

## 2021-02-27 PROCEDURE — 99284 EMERGENCY DEPT VISIT MOD MDM: CPT

## 2021-02-27 PROCEDURE — 77030008756 HC TU IRR SUC STRY -B: Performed by: SURGERY

## 2021-02-27 PROCEDURE — 99212 OFFICE O/P EST SF 10 MIN: CPT | Performed by: SURGERY

## 2021-02-27 PROCEDURE — 77030010507 HC ADH SKN DERMBND J&J -B: Performed by: SURGERY

## 2021-02-27 PROCEDURE — 81025 URINE PREGNANCY TEST: CPT

## 2021-02-27 PROCEDURE — 77030008608 HC TRCR ENDOSC SMTH AMR -B: Performed by: SURGERY

## 2021-02-27 RX ORDER — BISMUTH SUBSALICYLATE 262 MG
1 TABLET,CHEWABLE ORAL DAILY
COMMUNITY

## 2021-02-27 RX ORDER — LIDOCAINE HYDROCHLORIDE 10 MG/ML
0.1 INJECTION, SOLUTION EPIDURAL; INFILTRATION; INTRACAUDAL; PERINEURAL AS NEEDED
Status: DISCONTINUED | OUTPATIENT
Start: 2021-02-27 | End: 2021-02-27 | Stop reason: HOSPADM

## 2021-02-27 RX ORDER — SODIUM CHLORIDE 0.9 % (FLUSH) 0.9 %
5-40 SYRINGE (ML) INJECTION EVERY 8 HOURS
Status: DISCONTINUED | OUTPATIENT
Start: 2021-02-27 | End: 2021-02-28 | Stop reason: HOSPADM

## 2021-02-27 RX ORDER — SODIUM CHLORIDE 0.9 % (FLUSH) 0.9 %
5-40 SYRINGE (ML) INJECTION EVERY 8 HOURS
Status: DISCONTINUED | OUTPATIENT
Start: 2021-02-27 | End: 2021-02-27 | Stop reason: HOSPADM

## 2021-02-27 RX ORDER — ONDANSETRON 2 MG/ML
INJECTION INTRAMUSCULAR; INTRAVENOUS AS NEEDED
Status: DISCONTINUED | OUTPATIENT
Start: 2021-02-27 | End: 2021-02-27 | Stop reason: HOSPADM

## 2021-02-27 RX ORDER — ROCURONIUM BROMIDE 10 MG/ML
INJECTION, SOLUTION INTRAVENOUS AS NEEDED
Status: DISCONTINUED | OUTPATIENT
Start: 2021-02-27 | End: 2021-02-27 | Stop reason: HOSPADM

## 2021-02-27 RX ORDER — MORPHINE SULFATE 2 MG/ML
2 INJECTION, SOLUTION INTRAMUSCULAR; INTRAVENOUS ONCE
Status: COMPLETED | OUTPATIENT
Start: 2021-02-27 | End: 2021-02-27

## 2021-02-27 RX ORDER — FENTANYL CITRATE 50 UG/ML
INJECTION, SOLUTION INTRAMUSCULAR; INTRAVENOUS AS NEEDED
Status: DISCONTINUED | OUTPATIENT
Start: 2021-02-27 | End: 2021-02-27 | Stop reason: HOSPADM

## 2021-02-27 RX ORDER — ACETAMINOPHEN 325 MG/1
650 TABLET ORAL
Status: DISCONTINUED | OUTPATIENT
Start: 2021-02-27 | End: 2021-02-28 | Stop reason: HOSPADM

## 2021-02-27 RX ORDER — NALOXONE HYDROCHLORIDE 0.4 MG/ML
0.04 INJECTION, SOLUTION INTRAMUSCULAR; INTRAVENOUS; SUBCUTANEOUS
Status: DISCONTINUED | OUTPATIENT
Start: 2021-02-27 | End: 2021-02-27 | Stop reason: HOSPADM

## 2021-02-27 RX ORDER — SODIUM CHLORIDE, SODIUM LACTATE, POTASSIUM CHLORIDE, CALCIUM CHLORIDE 600; 310; 30; 20 MG/100ML; MG/100ML; MG/100ML; MG/100ML
125 INJECTION, SOLUTION INTRAVENOUS CONTINUOUS
Status: DISCONTINUED | OUTPATIENT
Start: 2021-02-27 | End: 2021-02-27 | Stop reason: HOSPADM

## 2021-02-27 RX ORDER — GLYCOPYRROLATE 0.2 MG/ML
INJECTION INTRAMUSCULAR; INTRAVENOUS AS NEEDED
Status: DISCONTINUED | OUTPATIENT
Start: 2021-02-27 | End: 2021-02-27 | Stop reason: HOSPADM

## 2021-02-27 RX ORDER — OXYCODONE HYDROCHLORIDE 5 MG/1
5 TABLET ORAL
Status: DISCONTINUED | OUTPATIENT
Start: 2021-02-27 | End: 2021-02-28 | Stop reason: HOSPADM

## 2021-02-27 RX ORDER — MIDAZOLAM HYDROCHLORIDE 1 MG/ML
INJECTION, SOLUTION INTRAMUSCULAR; INTRAVENOUS AS NEEDED
Status: DISCONTINUED | OUTPATIENT
Start: 2021-02-27 | End: 2021-02-27 | Stop reason: HOSPADM

## 2021-02-27 RX ORDER — LIDOCAINE HYDROCHLORIDE 20 MG/ML
INJECTION, SOLUTION EPIDURAL; INFILTRATION; INTRACAUDAL; PERINEURAL AS NEEDED
Status: DISCONTINUED | OUTPATIENT
Start: 2021-02-27 | End: 2021-02-27 | Stop reason: HOSPADM

## 2021-02-27 RX ORDER — FENTANYL CITRATE 50 UG/ML
25 INJECTION, SOLUTION INTRAMUSCULAR; INTRAVENOUS
Status: DISCONTINUED | OUTPATIENT
Start: 2021-02-27 | End: 2021-02-27 | Stop reason: HOSPADM

## 2021-02-27 RX ORDER — SODIUM CHLORIDE 0.9 % (FLUSH) 0.9 %
5-40 SYRINGE (ML) INJECTION AS NEEDED
Status: DISCONTINUED | OUTPATIENT
Start: 2021-02-27 | End: 2021-02-27 | Stop reason: HOSPADM

## 2021-02-27 RX ORDER — PROPOFOL 10 MG/ML
INJECTION, EMULSION INTRAVENOUS AS NEEDED
Status: DISCONTINUED | OUTPATIENT
Start: 2021-02-27 | End: 2021-02-27 | Stop reason: HOSPADM

## 2021-02-27 RX ORDER — SUCCINYLCHOLINE CHLORIDE 20 MG/ML
INJECTION INTRAMUSCULAR; INTRAVENOUS AS NEEDED
Status: DISCONTINUED | OUTPATIENT
Start: 2021-02-27 | End: 2021-02-27 | Stop reason: HOSPADM

## 2021-02-27 RX ORDER — SODIUM CHLORIDE 0.9 % (FLUSH) 0.9 %
5-40 SYRINGE (ML) INJECTION AS NEEDED
Status: DISCONTINUED | OUTPATIENT
Start: 2021-02-27 | End: 2021-02-28 | Stop reason: HOSPADM

## 2021-02-27 RX ORDER — ONDANSETRON 2 MG/ML
4 INJECTION INTRAMUSCULAR; INTRAVENOUS AS NEEDED
Status: DISCONTINUED | OUTPATIENT
Start: 2021-02-27 | End: 2021-02-27 | Stop reason: HOSPADM

## 2021-02-27 RX ORDER — ALBUTEROL SULFATE 0.83 MG/ML
2.5 SOLUTION RESPIRATORY (INHALATION) AS NEEDED
Status: DISCONTINUED | OUTPATIENT
Start: 2021-02-27 | End: 2021-02-27 | Stop reason: HOSPADM

## 2021-02-27 RX ORDER — KETOROLAC TROMETHAMINE 30 MG/ML
INJECTION, SOLUTION INTRAMUSCULAR; INTRAVENOUS AS NEEDED
Status: DISCONTINUED | OUTPATIENT
Start: 2021-02-27 | End: 2021-02-27 | Stop reason: HOSPADM

## 2021-02-27 RX ORDER — DIPHENHYDRAMINE HYDROCHLORIDE 50 MG/ML
12.5 INJECTION, SOLUTION INTRAMUSCULAR; INTRAVENOUS AS NEEDED
Status: DISCONTINUED | OUTPATIENT
Start: 2021-02-27 | End: 2021-02-27 | Stop reason: HOSPADM

## 2021-02-27 RX ORDER — HEPARIN SODIUM 5000 [USP'U]/ML
5000 INJECTION, SOLUTION INTRAVENOUS; SUBCUTANEOUS EVERY 8 HOURS
Status: DISCONTINUED | OUTPATIENT
Start: 2021-02-28 | End: 2021-02-28 | Stop reason: HOSPADM

## 2021-02-27 RX ORDER — EPHEDRINE SULFATE/0.9% NACL/PF 50 MG/5 ML
SYRINGE (ML) INTRAVENOUS AS NEEDED
Status: DISCONTINUED | OUTPATIENT
Start: 2021-02-27 | End: 2021-02-27 | Stop reason: HOSPADM

## 2021-02-27 RX ORDER — FAMOTIDINE 10 MG/ML
INJECTION INTRAVENOUS AS NEEDED
Status: DISCONTINUED | OUTPATIENT
Start: 2021-02-27 | End: 2021-02-27 | Stop reason: HOSPADM

## 2021-02-27 RX ORDER — OXYCODONE HYDROCHLORIDE 5 MG/1
10 TABLET ORAL
Status: DISCONTINUED | OUTPATIENT
Start: 2021-02-27 | End: 2021-02-28 | Stop reason: HOSPADM

## 2021-02-27 RX ORDER — NEOSTIGMINE METHYLSULFATE 1 MG/ML
INJECTION, SOLUTION INTRAVENOUS AS NEEDED
Status: DISCONTINUED | OUTPATIENT
Start: 2021-02-27 | End: 2021-02-27 | Stop reason: HOSPADM

## 2021-02-27 RX ORDER — FLUMAZENIL 0.1 MG/ML
0.2 INJECTION INTRAVENOUS
Status: DISCONTINUED | OUTPATIENT
Start: 2021-02-27 | End: 2021-02-27 | Stop reason: HOSPADM

## 2021-02-27 RX ORDER — SODIUM CHLORIDE, SODIUM LACTATE, POTASSIUM CHLORIDE, CALCIUM CHLORIDE 600; 310; 30; 20 MG/100ML; MG/100ML; MG/100ML; MG/100ML
INJECTION, SOLUTION INTRAVENOUS
Status: DISCONTINUED | OUTPATIENT
Start: 2021-02-27 | End: 2021-02-27 | Stop reason: HOSPADM

## 2021-02-27 RX ORDER — DEXAMETHASONE SODIUM PHOSPHATE 4 MG/ML
INJECTION, SOLUTION INTRA-ARTICULAR; INTRALESIONAL; INTRAMUSCULAR; INTRAVENOUS; SOFT TISSUE AS NEEDED
Status: DISCONTINUED | OUTPATIENT
Start: 2021-02-27 | End: 2021-02-27 | Stop reason: HOSPADM

## 2021-02-27 RX ORDER — HYDROMORPHONE HYDROCHLORIDE 1 MG/ML
.25-1 INJECTION, SOLUTION INTRAMUSCULAR; INTRAVENOUS; SUBCUTANEOUS
Status: DISCONTINUED | OUTPATIENT
Start: 2021-02-27 | End: 2021-02-27 | Stop reason: HOSPADM

## 2021-02-27 RX ORDER — BUPIVACAINE HYDROCHLORIDE AND EPINEPHRINE 5; 5 MG/ML; UG/ML
INJECTION, SOLUTION EPIDURAL; INTRACAUDAL; PERINEURAL AS NEEDED
Status: DISCONTINUED | OUTPATIENT
Start: 2021-02-27 | End: 2021-02-27 | Stop reason: HOSPADM

## 2021-02-27 RX ADMIN — DEXAMETHASONE SODIUM PHOSPHATE 4 MG: 4 INJECTION, SOLUTION INTRAMUSCULAR; INTRAVENOUS at 16:47

## 2021-02-27 RX ADMIN — SODIUM CHLORIDE, POTASSIUM CHLORIDE, SODIUM LACTATE AND CALCIUM CHLORIDE: 600; 310; 30; 20 INJECTION, SOLUTION INTRAVENOUS at 17:50

## 2021-02-27 RX ADMIN — Medication 10 ML: at 21:34

## 2021-02-27 RX ADMIN — ROCURONIUM BROMIDE 25 MG: 10 INJECTION INTRAVENOUS at 16:51

## 2021-02-27 RX ADMIN — SODIUM CHLORIDE, POTASSIUM CHLORIDE, SODIUM LACTATE AND CALCIUM CHLORIDE: 600; 310; 30; 20 INJECTION, SOLUTION INTRAVENOUS at 16:25

## 2021-02-27 RX ADMIN — Medication 3 MG: at 17:42

## 2021-02-27 RX ADMIN — FAMOTIDINE 20 MG: 10 INJECTION INTRAVENOUS at 16:37

## 2021-02-27 RX ADMIN — PROPOFOL 150 MG: 10 INJECTION, EMULSION INTRAVENOUS at 16:43

## 2021-02-27 RX ADMIN — SUCCINYLCHOLINE CHLORIDE 100 MG: 20 INJECTION, SOLUTION INTRAMUSCULAR; INTRAVENOUS at 16:44

## 2021-02-27 RX ADMIN — FENTANYL CITRATE 100 MCG: 0.05 INJECTION, SOLUTION INTRAMUSCULAR; INTRAVENOUS at 16:43

## 2021-02-27 RX ADMIN — SODIUM CHLORIDE 100 MCG: 9 INJECTION INTRAMUSCULAR; INTRAVENOUS; SUBCUTANEOUS at 17:09

## 2021-02-27 RX ADMIN — FENTANYL CITRATE 50 MCG: 0.05 INJECTION, SOLUTION INTRAMUSCULAR; INTRAVENOUS at 17:24

## 2021-02-27 RX ADMIN — ROCURONIUM BROMIDE 5 MG: 10 INJECTION INTRAVENOUS at 16:43

## 2021-02-27 RX ADMIN — ONDANSETRON HYDROCHLORIDE 4 MG: 2 SOLUTION INTRAMUSCULAR; INTRAVENOUS at 16:46

## 2021-02-27 RX ADMIN — SODIUM CHLORIDE 100 MCG: 9 INJECTION INTRAMUSCULAR; INTRAVENOUS; SUBCUTANEOUS at 17:05

## 2021-02-27 RX ADMIN — FENTANYL CITRATE 50 MCG: 0.05 INJECTION, SOLUTION INTRAMUSCULAR; INTRAVENOUS at 17:39

## 2021-02-27 RX ADMIN — OXYCODONE 10 MG: 5 TABLET ORAL at 21:33

## 2021-02-27 RX ADMIN — MIDAZOLAM HYDROCHLORIDE 2 MG: 2 INJECTION, SOLUTION INTRAMUSCULAR; INTRAVENOUS at 16:37

## 2021-02-27 RX ADMIN — MORPHINE SULFATE 2 MG: 2 INJECTION, SOLUTION INTRAMUSCULAR; INTRAVENOUS at 11:54

## 2021-02-27 RX ADMIN — CEFOTETAN DISODIUM 2 G: 2 INJECTION, POWDER, FOR SOLUTION INTRAMUSCULAR; INTRAVENOUS at 16:50

## 2021-02-27 RX ADMIN — LIDOCAINE HYDROCHLORIDE 40 MG: 20 INJECTION, SOLUTION EPIDURAL; INFILTRATION; INTRACAUDAL; PERINEURAL at 16:43

## 2021-02-27 RX ADMIN — KETOROLAC TROMETHAMINE 30 MG: 30 INJECTION INTRAMUSCULAR; INTRAVENOUS at 17:50

## 2021-02-27 RX ADMIN — SODIUM CHLORIDE 100 MCG: 9 INJECTION INTRAMUSCULAR; INTRAVENOUS; SUBCUTANEOUS at 17:06

## 2021-02-27 RX ADMIN — Medication 20 MG: at 17:10

## 2021-02-27 RX ADMIN — Medication 20 MG: at 17:06

## 2021-02-27 RX ADMIN — GLYCOPYRROLATE 0.6 MG: 0.2 INJECTION INTRAMUSCULAR; INTRAVENOUS at 17:42

## 2021-02-27 NOTE — PERIOP NOTES
POST ANESTHESIA CARE    DISCHARGE / TRANSFER NOTE    Chris Cagle was   transferred   via   Bed    to  hospital room 420     . Patient was escorted by   nurse    . Patient verbalized   appreciation and was very pleased with care received   throughout their stay. Patient was discharged in     pleasant mood  . Pain at discharge/transfer was      0  /10. Discharge, medication and follow-up instructions were verbalized as understood prior to discharge  (if applicable for same-day procedures being discharged.)    All personal belongings have been returned to patient, and patient/family verbally confirm receiving belongings as all present. TRANSFER - OUT REPORT:    PHONE  report  WAS  given at 6:44 PM to   [employer]  RN  receiving   Chris Cagle   and being transferred to  [facility]    for routine post - op  Following General for Procedure(s) (LRB):  CHOLECYSTECTOMY LAPAROSCOPIC WITH GRAMS (N/A) by  Mehreen Moralez MD.    Patient Situation, Background, Assessment and Recommendations (SBAR) was provided and included information from the following SBAR report(s) (SBAR, OR Summary and MAR) which were reviewed with the receiving nurse. Lines:   Peripheral IV 02/27/21 Right Antecubital (Active)   Site Assessment Clean, dry, & intact 02/27/21 0949   Phlebitis Assessment 0 02/27/21 0949   Infiltration Assessment 0 02/27/21 0949   Dressing Type Transparent 02/27/21 0949   Hub Color/Line Status Pink;Flushed;Patent 02/27/21 0949   Action Taken Blood drawn 02/27/21 0949      Also Reviewed (checked if applicable):   [] NO ADDITIONAL REVIEWS  [] PCA Drug and Settings     [] Cold Therapy with extra gels     [] On-Q @  ml/hr   [] Drains x       [] Significant Wound care/devices (e.g. Wound vac, etc.)     [] Holding pt in PACU awaiting bed availability - additional care provided and eMAR review  [] Vent Settings      [] Critical Care Drips  Contact Precautions:  There are currently no Active Isolations  Patient transported with:  Registered Nurse    Additional Information: Interval bedside SBAR report was completed at [address] [facility]. There were:  [x] No changes to patient condition since last assessment and/or communication with receiving RN.    [] There were changes to patient care/condition since last communication with receiving RN and were reviewed at        verbal bedside SBAR change of staff. Patient is in:   [x] No Acute Distress     [] Mild/Moderate Acute discomfort - treated and controlled  [] Acute dicomfort/distress - treated but still not fully controlled  [] Acute dicomfort/distress reported, however maximum allowable dosing has been achieved and no further        doses allowed    Vital Signs are: [x] Stable - consistent with end of PACU care. [] Unstable -  receiving continued care in appropriate setting    [] 2 RN skin check completed with receiving RN.  [] Spouse/family/caregiver at bedside during/after staff handoff of care. [x] No Spouse/family/caregiver with the patient at this time. After report, opportunity for questions and clarification was provided.     Signed: Kaye COLLIERN RN-BC

## 2021-02-27 NOTE — ED NOTES
Used interpretor services to assess the patient. Patient reports last drink/food intake at 0800 this morning. Patient reports history of gold stones and stated pain 10/10. Will let provider know.

## 2021-02-27 NOTE — ED PROVIDER NOTES
This is a 80-year-old female who presents ambulatory to the emergency room with complaints of right upper quadrant pain. Patient states she has known gallstones. Started having abdominal pain about a month ago but has worsened over the past 4 days. Patient states her last bowel movement was 4 days ago and was green. Positive nausea, no vomiting. States she cannot even eat \"a piece of lettuce without getting sick. \"  Denies seeing a surgeon previously for her complaints. Was diagnosed with gallstones during pregnancy and was told \"there is nothing we can do. \"  Denies any chest pain, shortness of breath, dizziness, fever or chills. No urinary urgency or frequency. Has not taken any medication prior to arrival for her symptoms. Eating worsens the pain so patient has basically stopped eating with last drink early this morning. Has not eaten a meal since yesterday. There are no further complaints at this time. Shawna Andrew, DO  Past Medical History:  No date: Depression      Comment:  never on Rx, was depressed when her mother  7 yr ago  No date: Gallstones  No past surgical history on file. Past Medical History:   Diagnosis Date    Depression     never on Rx, was depressed when her mother  9 yr ago   [address] [place] [address] Gallstones        No past surgical history on file. No family history on file.     Social History     Socioeconomic History    Marital status: SINGLE     Spouse name: Not on file    Number of children: Not on file    Years of education: Not on file    Highest education level: Not on file   Occupational History    Not on file   Social Needs    Financial resource strain: Not on file    Food insecurity     Worry: Not on file     Inability: Not on file    Transportation needs     Medical: Not on file     Non-medical: Not on file   Tobacco Use    Smoking status: Never Smoker    Smokeless tobacco: Never Used   Substance and Sexual Activity    Alcohol use: Not Currently    Drug use: Never    Sexual activity: Not Currently   Lifestyle    Physical activity     Days per week: Not on file     Minutes per session: Not on file    Stress: Not on file   Relationships    Social connections     Talks on phone: Not on file     Gets together: Not on file     Attends Jehovah's witness service: Not on file     Active member of club or organization: Not on file     Attends meetings of clubs or organizations: Not on file     Relationship status: Not on file    Intimate partner violence     Fear of current or ex partner: Not on file     Emotionally abused: Not on file     Physically abused: Not on file     Forced sexual activity: Not on file   Other Topics Concern    Not on file   Social History Narrative    Not on file         ALLERGIES: Patient has no known allergies. Review of Systems   Constitutional: Negative for appetite change, chills, diaphoresis, fatigue and fever. HENT: Negative for congestion, ear discharge, ear pain, sinus pressure, sinus pain, sore throat and trouble swallowing. Eyes: Negative for photophobia, pain, redness and visual disturbance. Respiratory: Negative for chest tightness, shortness of breath and wheezing. Cardiovascular: Negative for chest pain and palpitations. Gastrointestinal: Positive for abdominal pain and nausea. Negative for abdominal distention and vomiting. Endocrine: Negative. Genitourinary: Negative for difficulty urinating, flank pain, frequency and urgency. Musculoskeletal: Negative for back pain, neck pain and neck stiffness. Skin: Negative for color change, pallor, rash and wound. Allergic/Immunologic: Negative. Neurological: Negative for dizziness, speech difficulty, weakness and headaches. Hematological: Does not bruise/bleed easily. Psychiatric/Behavioral: Negative for behavioral problems. The patient is not nervous/anxious.         Vitals:    02/27/21 0927   BP: 112/71   Pulse: 75   Resp: 16   Temp: 97.9 °F (36.6 °C)   SpO2: 98%   Weight: 77.1 kg (170 lb)   Height: 5' 3\" (1.6 m)            Physical Exam  Vitals signs and nursing note reviewed. Constitutional:       General: She is not in acute distress. Appearance: Normal appearance. She is well-developed. She is not ill-appearing. HENT:      Head: Normocephalic and atraumatic. Right Ear: External ear normal.      Left Ear: External ear normal.      Nose: Nose normal.      Mouth/Throat:      Mouth: Mucous membranes are moist.   Eyes:      General:         Right eye: No discharge. Left eye: No discharge. Conjunctiva/sclera: Conjunctivae normal.      Pupils: Pupils are equal, round, and reactive to light. Neck:      Musculoskeletal: Normal range of motion and neck supple. Vascular: No JVD. Trachea: No tracheal deviation. Cardiovascular:      Rate and Rhythm: Normal rate and regular rhythm. Pulses: Normal pulses. Heart sounds: Normal heart sounds. No murmur. No gallop. Pulmonary:      Effort: Pulmonary effort is normal. No respiratory distress. Breath sounds: Normal breath sounds. No wheezing or rales. Chest:      Chest wall: No tenderness. Abdominal:      General: Bowel sounds are normal. There is no distension. Palpations: Abdomen is soft. Tenderness: There is abdominal tenderness in the right upper quadrant. There is guarding. There is no rebound. Positive signs include Lora's sign. Genitourinary:     Comments: Negative    Musculoskeletal: Normal range of motion. General: No tenderness. Skin:     General: Skin is warm and dry. Capillary Refill: Capillary refill takes less than 2 seconds. Coloration: Skin is not pale. Findings: No erythema or rash. Neurological:      General: No focal deficit present. Mental Status: She is alert and oriented to person, place, and time. Motor: No weakness.       Coordination: Coordination normal.   Psychiatric:         Mood and Affect: Mood normal.         Behavior: Behavior normal.         Thought Content: Thought content normal.         Judgment: Judgment normal.          MDM  Number of Diagnoses or Management Options  Diagnosis management comments: Differential diagnosis includes cholelithiasis, cholecystitis, choledocholithiasis and others. Spoke with general surgery who will admit patient for operating room. Discussed with patient who is in agreement with plan of care. Discussed with Dr. Rajendra Valero who is in agreement with plan of care. Rapid SARS negative. Amount and/or Complexity of Data Reviewed  Clinical lab tests: reviewed and ordered  Tests in the radiology section of CPT®: reviewed and ordered  Discuss the patient with other providers: yes (Dr. Rajendra Valero  )         Labs Reviewed   METABOLIC PANEL, COMPREHENSIVE - Abnormal; Notable for the following components:       Result Value    Protein, total 8.8 (*)     Globulin 4.4 (*)     A-G Ratio 1.0 (*)     All other components within normal limits   URINALYSIS W/MICROSCOPIC - Abnormal; Notable for the following components:    Ketone 15 (*)     Blood TRACE (*)     Leukocyte Esterase SMALL (*)     All other components within normal limits   URINE CULTURE HOLD SAMPLE   COVID-19 RAPID TEST   CBC WITH AUTOMATED DIFF   LIPASE   SARS-COV-2   SAMPLES BEING HELD   HCG URINE, QL. - [employer]    Result Date: 2/27/2021  1. Cholelithiasis. Suggestion of gallbladder wall thickening although evaluation is limited by contracted gallbladder. No sonographic Lora sign. 2.  7 mm common bile duct which is mildly dilated. Choledocholithiasis is not excluded. 3.  Small hyperechoic lesion in the right kidney may represent a small angiomyolipoma. Mukesh Matthew 1:20 PM  Discussed plan of care with Dr. Joan Aguayo who will be in to see patient. Patient is n.p.o. For possible OR today.   Procedures

## 2021-02-27 NOTE — ED NOTES
TRANSFER - OUT REPORT:    Verbal report given to OR RN(name) on Niurka Paulino  being transferred to OR pre op(unit) for routine progression of care       Report consisted of patients Situation, Background, Assessment and   Recommendations(SBAR). Information from the following report(s) SBAR, ED Summary, Intake/Output, MAR and Recent Results was reviewed with the receiving nurse. Lines:   Peripheral IV 02/27/21 Right Antecubital (Active)   Site Assessment Clean, dry, & intact 02/27/21 0949   Phlebitis Assessment 0 02/27/21 0949   Infiltration Assessment 0 02/27/21 0949   Dressing Type Transparent 02/27/21 0949   Hub Color/Line Status Pink;Flushed;Patent 02/27/21 0949   Action Taken Blood drawn 02/27/21 6319        Opportunity for questions and clarification was provided.       Patient transported with:   Registered Nurse   Visit Vitals  /84   Pulse 60   Temp 98.5 °F (36.9 °C)   Resp 16   Ht 5' 3\" (1.6 m)   Wt 77.1 kg (170 lb)   SpO2 100%   BMI 30.11 kg/m²

## 2021-02-27 NOTE — ANESTHESIA POSTPROCEDURE EVALUATION
Procedure(s):  CHOLECYSTECTOMY LAPAROSCOPIC WITH GRAMS. general    Anesthesia Post Evaluation      Multimodal analgesia: multimodal analgesia not used between 6 hours prior to anesthesia start to PACU discharge  Patient location during evaluation: PACU  Patient participation: complete - patient participated  Level of consciousness: awake  Pain management: adequate  Airway patency: patent  Anesthetic complications: no  Cardiovascular status: acceptable, blood pressure returned to baseline and hemodynamically stable  Respiratory status: acceptable  Hydration status: acceptable  Post anesthesia nausea and vomiting:  controlled  Final Post Anesthesia Temperature Assessment:  Normothermia (36.0-37.5 degrees C)      INITIAL Post-op Vital signs:   Vitals Value Taken Time   /62 02/27/21 1806   Temp 36.4 °C (97.5 °F) 02/27/21 1806   Pulse 99 02/27/21 1808   Resp 18 02/27/21 1808   SpO2 99 % 02/27/21 1808   Vitals shown include unvalidated device data.

## 2021-02-27 NOTE — ED TRIAGE NOTES
Van  used: Right abdominal pain that radiates to back. Has gallstones, was diagnosed with them a year ago. Pain has been going on for a month but has worsened recently. +nausea. States that she cannot eat now without pain. Has not seen surgeon for this. Has taken Tylenol for pain without relief. Last BM was green and was 4 days ago. Pt last ate at 1700 last night and has only had water to drink this AM. Denies any chance of pregnancy.

## 2021-02-27 NOTE — PERIOP NOTES
TRANSFER - OUT REPORT:    Verbal report given to Wendie Duverney  being transferred to 4th floor for routine post - op       Report consisted of patients Situation, Background, Assessment and   Recommendations(SBAR). Information from the following report(s) SBAR, OR Summary, Procedure Summary, Intake/Output and MAR was reviewed with the receiving nurse. Lines:   Peripheral IV 02/27/21 Right Antecubital (Active)   Site Assessment Clean, dry, & intact 02/27/21 0949   Phlebitis Assessment 0 02/27/21 0949   Infiltration Assessment 0 02/27/21 0949   Dressing Type Transparent 02/27/21 0949   Hub Color/Line Status Pink;Flushed;Patent 02/27/21 0949   Action Taken Blood drawn 02/27/21 3014        Opportunity for questions and clarification was provided.       Patient transported with:   Registered Nurse

## 2021-02-27 NOTE — PERIOP NOTES
4th floor Charge RN Kolby Freeman  RN)  notified of admission/pending return and awaiting receiving ( tba  RN) call-back for assignment to/return to room 420.

## 2021-02-27 NOTE — BRIEF OP NOTE
Brief Postoperative Note    Patient: Eliz Rodriguez  YOB: 1990  MRN: [id number]    Date of Procedure: 2/27/2021     Pre-Op Diagnosis:  cholecystitis    Post-Op Diagnosis: Same as preoperative diagnosis.       Procedure(s):  CHOLECYSTECTOMY LAPAROSCOPIC WITH GRAMS    Surgeon(s):  Bonne Cheadle, MD    Surgical Assistant: Surg Asst-1: Doris Goss LPN    Anesthesia: General     Estimated Blood Loss (mL): less than 50     Complications: None    Specimens:   ID Type Source Tests Collected by Time Destination   1 : gallbladder Preservative Gallbladder  Bonne Cheadle, MD 2/27/2021 1707 Pathology        Implants: * No implants in log *    Drains:   Orogastric Tube 02/27/21 (Active)       Findings: inflamed gallbladder with large stone in neck, no filling defects on cholangiogram    Electronically Signed by Manju Archer MD on 2/27/2021 at 5:43 PM

## 2021-02-27 NOTE — ANESTHESIA PREPROCEDURE EVALUATION
Anesthetic History   No history of anesthetic complications            Review of Systems / Medical History  Patient summary reviewed and pertinent labs reviewed    Pulmonary  Within defined limits                 Neuro/Psych   Within defined limits           Cardiovascular  Within defined limits                     GI/Hepatic/Renal               Comments: cholelithiasis Endo/Other  Within defined limits           Other Findings              Physical Exam    Airway  Mallampati: II    Neck ROM: normal range of motion   Mouth opening: Normal     Cardiovascular    Rhythm: regular  Rate: normal         Dental    Dentition: Lower dentition intact and Upper dentition intact     Pulmonary  Breath sounds clear to auscultation               Abdominal  GI exam deferred       Other Findings            Anesthetic Plan    ASA: 1  Anesthesia type: general          Induction: Intravenous  Anesthetic plan and risks discussed with: Patient

## 2021-02-27 NOTE — H&P
[facility] Consultation for: Abdominal pain    Requesting Physician: Pamela Sandhu NP    History of Present Illness:      Lavon Duggan is a 27 y.o. female who presented to the emergency department with a 3-day history of right upper quadrant abdominal pain, which radiates to the back. She has had multiple episodes of similar pain in the past.  The pain has been changing in intensity, but is more moderate to severe today. She thinks that eating food exacerbates the pain. It is associated with nausea but no emesis. She has some mild constipation. She denies fevers or diarrhea. Past Medical History:   Diagnosis Date    Depression     never on Rx, was depressed when her mother  9 yr ago    Gallstones        History reviewed. No pertinent surgical history.     Social History     Socioeconomic History    Marital status: SINGLE     Spouse name: Not on file    Number of children: Not on file    Years of education: Not on file    Highest education level: Not on file   Occupational History    Not on file   Social Needs    Financial resource strain: Not on file    Food insecurity     Worry: Not on file     Inability: Not on file    Transportation needs     Medical: Not on file     Non-medical: Not on file   Tobacco Use    Smoking status: Never Smoker    Smokeless tobacco: Never Used   Substance and Sexual Activity    Alcohol use: Not Currently    Drug use: Never    Sexual activity: Not Currently   Lifestyle    Physical activity     Days per week: Not on file     Minutes per session: Not on file    Stress: Not on file   Relationships    Social connections     Talks on phone: Not on file     Gets together: Not on file     Attends Hinduism service: Not on file     Active member of club or organization: Not on file     Attends meetings of clubs or organizations: Not on file     Relationship status: Not on file    Intimate partner violence     Fear of current or ex partner: Not on file     Emotionally abused: Not on file     Physically abused: Not on file     Forced sexual activity: Not on file   Other Topics Concern    Not on file   Social History Narrative    Not on file       History reviewed. No pertinent family history. Current Facility-Administered Medications:     cefoTEtan (CEFOTAN) 2 g in sterile water (preservative free) 20 mL IV syringe, 2 g, IntraVENous, ON CALL TO OR, Jarett Miguel MD    Current Outpatient Medications:     norgestimate-ethinyl estradioL (ORTHO-CYCLEN, SPRINTEC) 0.25-35 mg-mcg tab, Take 1 Tab by mouth daily. , Disp: 3 Dose Pack, Rfl: 3    No Known Allergies    ROS   Constitutional: negative  Ears, Nose, Mouth, Throat, and Face: negative  Respiratory: negative  Cardiovascular: negative  Gastrointestinal: As above  Genitourinary:negative  Integument/Breast: negative  Hematologic/Lymphatic: negative  Behavioral/Psychiatric: negative  Allergic/Immunologic: negative      Physical Exam:     Visit Vitals  /84   Pulse 60   Temp 98.5 °F (36.9 °C)   Resp 16   Ht 5' 3\" (1.6 m)   Wt 170 lb (77.1 kg)   SpO2 100%   BMI 30.11 kg/m²       General - alert and oriented, no apparent distress, well developed  HEENT - NC/AT, no scleral icterus  Pulm - CTAB, normal inspiratory effort  CV - RRR, no M/R/G  Abd -soft, nondistended, tender to palpation in right upper quadrant without rebound or guarding  Ext - warm, well perfused, no edema  Lymphatics -no palpable inguinal lymph nodes  Skin - supple and no rashes  Psychiatric - normal affect, good mood    Labs  Recent Results (from the past 24 hour(s))   CBC WITH AUTOMATED DIFF    Collection Time: 02/27/21  9:43 AM   Result Value Ref Range    WBC 5.9 3.6 - 11.0 K/uL    RBC 4.72 3.80 - 5.20 M/uL    HGB 14.1 11.5 - 16.0 g/dL    HCT 40.9 35.0 - 47.0 %    MCV 86.7 80.0 - 99.0 FL    MCH 29.9 26.0 - 34.0 PG    MCHC 34.5 30.0 - 36.5 g/dL    RDW 13.4 11.5 - 14.5 %    PLATELET 215 227 - 014 K/uL    MPV 11.1 8.9 - 12.9 FL    NRBC 0.0 0  WBC    ABSOLUTE NRBC 0.00 0.00 - 0.01 K/uL    NEUTROPHILS 66 32 - 75 %    LYMPHOCYTES 25 12 - 49 %    MONOCYTES 7 5 - 13 %    EOSINOPHILS 1 0 - 7 %    BASOPHILS 1 0 - 1 %    IMMATURE GRANULOCYTES 0 0.0 - 0.5 %    ABS. NEUTROPHILS 3.9 1.8 - 8.0 K/UL    ABS. LYMPHOCYTES 1.5 0.8 - 3.5 K/UL    ABS. MONOCYTES 0.4 0.0 - 1.0 K/UL    ABS. EOSINOPHILS 0.1 0.0 - 0.4 K/UL    ABS. BASOPHILS 0.0 0.0 - 0.1 K/UL    ABS. IMM. GRANS. 0.0 0.00 - 0.04 K/UL    DF AUTOMATED     METABOLIC PANEL, COMPREHENSIVE    Collection Time: 02/27/21  9:43 AM   Result Value Ref Range    Sodium 139 136 - 145 mmol/L    Potassium 3.6 3.5 - 5.1 mmol/L    Chloride 108 97 - 108 mmol/L    CO2 25 21 - 32 mmol/L    Anion gap 6 5 - 15 mmol/L    Glucose 74 65 - 100 mg/dL    BUN 11 6 - 20 MG/DL    Creatinine 0.73 0.55 - 1.02 MG/DL    BUN/Creatinine ratio 15 12 - 20      GFR est AA >60 >60 ml/min/1.73m2    GFR est non-AA >60 >60 ml/min/1.73m2    Calcium 9.5 8.5 - 10.1 MG/DL    Bilirubin, total 0.9 0.2 - 1.0 MG/DL    ALT (SGPT) 24 12 - 78 U/L    AST (SGOT) 16 15 - 37 U/L    Alk.  phosphatase 68 45 - 117 U/L    Protein, total 8.8 (H) 6.4 - 8.2 g/dL    Albumin 4.4 3.5 - 5.0 g/dL    Globulin 4.4 (H) 2.0 - 4.0 g/dL    A-G Ratio 1.0 (L) 1.1 - 2.2     LIPASE    Collection Time: 02/27/21  9:43 AM   Result Value Ref Range    Lipase 107 73 - 393 U/L   URINALYSIS W/MICROSCOPIC    Collection Time: 02/27/21  9:43 AM   Result Value Ref Range    Color YELLOW/STRAW      Appearance CLEAR CLEAR      Specific gravity <1.005 1.003 - 1.030    pH (UA) 5.5 5.0 - 8.0      Protein Negative NEG mg/dL    Glucose Negative NEG mg/dL    Ketone 15 (A) NEG mg/dL    Bilirubin Negative NEG      Blood TRACE (A) NEG      Urobilinogen 0.2 0.2 - 1.0 EU/dL    Nitrites Negative NEG      Leukocyte Esterase SMALL (A) NEG      WBC 5-10 0 - 4 /hpf    RBC 0-5 0 - 5 /hpf    Epithelial cells FEW FEW /lpf    Bacteria Negative NEG /hpf   SAMPLES BEING HELD    Collection Time: 02/27/21  9:43 AM   Result Value Ref Range    SAMPLES BEING HELD 1SST,1RD     COMMENT        Add-on orders for these samples will be processed based on acceptable specimen integrity and analyte stability, which may vary by analyte. URINE CULTURE HOLD SAMPLE    Collection Time: 02/27/21  9:43 AM    Specimen: Serum   Result Value Ref Range    Urine culture hold        Urine on hold in Microbiology dept for 2 days. If unpreserved urine is submitted, it cannot be used for addtional testing after 24 hours, recollection will be required. HCG URINE, QL. - POC    Collection Time: 02/27/21  9:52 AM   Result Value Ref Range    Pregnancy test,urine (POC) Negative NEG     SARS-COV-2    Collection Time: 02/27/21 11:30 AM   Result Value Ref Range    SARS-CoV-2 Please find results under separate order     COVID-19 RAPID TEST    Collection Time: 02/27/21 11:30 AM   Result Value Ref Range    Specimen source Nasopharyngeal      COVID-19 rapid test Not detected NOTD             Imaging  US abd:  IMPRESSION  1. Cholelithiasis. Suggestion of gallbladder wall thickening although  evaluation is limited by contracted gallbladder. No sonographic Lora sign.     2.  7 mm common bile duct which is mildly dilated. Choledocholithiasis is not  excluded.     3.  Small hyperechoic lesion in the right kidney may represent a small  angiomyolipoma. .  I have personally reviewed all of the pertinent images     Assessment:     Clare Tenorio is a 27 y.o. female with abdominal pain and cholelithiasis. Her symptoms are consistent with acute on chronic cholecystitis. She has a borderline dilated common bile duct which is concerning for possible choledocholithiasis. Her bilirubin is normal, however, so she may have already passed any stones. Recommendations:     1. Given her history and prolonged symptoms, I believe surgical intervention is warranted at this time. We will admit her for a laparoscopic cholecystectomy. Cholangiogram will be performed to rule out choledocholithiasis. We discussed risks including bleeding, infection, injury to bile duct or other structures. The patient understood and was willing to proceed.     Lonnie Peraza MD

## 2021-02-28 VITALS
DIASTOLIC BLOOD PRESSURE: 54 MMHG | TEMPERATURE: 98.7 F | BODY MASS INDEX: 31.54 KG/M2 | OXYGEN SATURATION: 96 % | HEIGHT: 63 IN | RESPIRATION RATE: 16 BRPM | SYSTOLIC BLOOD PRESSURE: 93 MMHG | HEART RATE: 73 BPM | WEIGHT: 178 LBS

## 2021-02-28 PROCEDURE — 99217 PR OBSERVATION CARE DISCHARGE MANAGEMENT: CPT | Performed by: SURGERY

## 2021-02-28 PROCEDURE — 99218 HC RM OBSERVATION: CPT

## 2021-02-28 PROCEDURE — 74011250637 HC RX REV CODE- 250/637: Performed by: SURGERY

## 2021-02-28 PROCEDURE — 74011250636 HC RX REV CODE- 250/636: Performed by: SURGERY

## 2021-02-28 RX ORDER — HYDROCODONE BITARTRATE AND ACETAMINOPHEN 5; 325 MG/1; MG/1
1 TABLET ORAL
Qty: 20 TAB | Refills: 0 | Status: SHIPPED | OUTPATIENT
Start: 2021-02-28 | End: 2021-03-03

## 2021-02-28 RX ADMIN — HEPARIN SODIUM 5000 UNITS: 5000 INJECTION INTRAVENOUS; SUBCUTANEOUS at 07:06

## 2021-02-28 RX ADMIN — OXYCODONE 10 MG: 5 TABLET ORAL at 09:40

## 2021-02-28 RX ADMIN — Medication 10 ML: at 07:05

## 2021-02-28 NOTE — PROGRESS NOTES
Primary Nurse Eagle Garcia and Ai Woodall, RN performed a dual skin assessment on this patient No impairment noted  Shalom score is 23

## 2021-02-28 NOTE — DISCHARGE SUMMARY
Physician Discharge Summary     Patient ID:  Ashley Lee  [id number]  66 y.o.  1990    Admit Date: 2021    Discharge Date:     Admission Diagnoses: Acute cholecystitis [K81.0]    Discharge Diagnoses: Active Problems:    Acute cholecystitis (2021)         Admission Condition: Fair    Discharge Condition: Good    Procedure(s):    2021 - Procedure(s):  [id number] Course:   Normal hospital course for this procedure. Consults: None    Significant Diagnostic Studies: Ultrasound, Laparoscopic cholecystectomy with cholangiogram    Disposition: home    Patient Instructions:   Current Discharge Medication List      START taking these medications    Details   HYDROcodone-acetaminophen (NORCO) 5-325 mg per tablet Take 1 Tab by mouth every six (6) hours as needed for Pain for up to 3 days. Max Daily Amount: 4 Tabs. Qty: 20 Tab, Refills: 0    Associated Diagnoses: Acute cholecystitis; Postoperative pain         CONTINUE these medications which have NOT CHANGED    Details   multivitamin (ONE A DAY) tablet Take 1 Tab by mouth daily. norgestimate-ethinyl estradioL (ORTHO-CYCLEN, SPRINTEC) 0.25-35 mg-mcg tab Take 1 Tab by mouth daily.   Qty: 3 Dose Pack, Refills: 3    Associated Diagnoses: Irregular menses;  (spontaneous vaginal delivery)             Activity: Activity as tolerated  Diet: Regular Diet  Wound Care: As directed    Follow-up with Levy Herrera MD in 2 week(s)  Follow-up tests/labs none    Signed:  Levy Herrera MD  2021  9:34 AM

## 2021-02-28 NOTE — OP NOTES
[facility] [address]  OPERATIVE REPORT    Name:  Kavon Argueta  MR#:  [id number]  :  1990  ACCOUNT #:  [de-identified]  DATE OF SERVICE:  2021    PREOPERATIVE DIAGNOSES:  1. Acute cholecystitis. 2.  Biliary duct dilation. POSTOPERATIVE DIAGNOSES:  1. Acute cholecystitis. 2.  Biliary duct dilation. PROCEDURE PERFORMED:  Laparoscopic cholecystectomy with cholangiogram.    SURGEON:  Cady Lynn MD    ASSISTANT:  SINCERE Laguerre    ANESTHESIA:  General endotracheal.    COMPLICATIONS:  None. SPECIMENS REMOVED:  Gallbladder. IMPLANTS:  None. ESTIMATED BLOOD LOSS:  50 mL. INDICATIONS:  This patient is a very pleasant 55-year-old female who presented to the emergency department with a three-day history of right upper quadrant abdominal pain. She was found to have cholelithiasis on ultrasound and a borderline dilated common bile duct. Her bilirubin; however, was normal.  I discussed with her laparoscopic cholecystectomy. We discussed risks including bleeding, infection, injury to bile duct or other structures. She agreed to proceed. We discussed that we would perform a cholangiogram at the time of the surgery due to the biliary duct dilation to evaluate for common bile duct stones. FINDINGS:  Gallbladder was inflamed with inflammatory adhesions to it. On cholangiogram, bile flowed freely into the duodenum. There was no evidence of obstructing stone. PROCEDURE:  Informed consent was obtained. The patient was brought back to the operating room and placed in the supine position on the operating room table. General anesthesia induced. The patient's abdomen was prepped and draped in the usual sterile fashion. Preincision timeout procedure performed and preincision antibiotics were given. After infiltration of local anesthetic, a skin incision was made and carried to the umbilicus.   Using the Veress needle, the peritoneal cavity was entered and insufflated to 15 mmHg. Subsequently, a 5-mm trocar was inserted. We inspected the area around the insertion site for injury to bile duct or other structures, there were none. Additional ports were placed including a 12 mm subxiphoid and two 5 mm right upper quadrant ports. The gallbladder was grasped and retracted cephalad of the liver. The infundibulum was grasped and retracted laterally. The inflammatory adhesions were taken down bluntly. The peritoneum overlying the gallbladder was taken down and the cholecystic triangle was dissected. Critical view of safety was achieved with the cystic duct and the cystic artery being the only structures visualized upon entering the gallbladder. The cystic duct was clipped adjacent to the gallbladder. An incision was made in the duct. Cholangiogram catheter was then advanced easily into the duct. Cholangiogram was performed with the findings as above. Once we were satisfied with this, the cholangiogram catheter was removed and the duct was clipped proximally twice and ligated. The cystic artery was triply clipped and then ligated. The gallbladder was dissected free of the liver bed using electrocautery. It was placed in an EndoCatch bag and removed. The fascial incision had to be enlarged somewhat to accommodate a large stone in the gallbladder. It was examined for hemostasis and this was achieved with cautery. There was also a small tear in the liver capsule with the falciform partially came away from it and this was also controlled with cautery. The area was thoroughly irrigated. The subxiphoid fascial incision was closed with 0 Vicryl on endo closure device. The skin incisions were closed with 4-0 subcuticular Vicryl and dressed with Dermabond. The patient tolerated the procedure well. All sponge and instrument counts were correct.       MD MANUEL [email or web address]  D:  02/27/2021 17:52  T:  02/28/2021 4:17  JOB #:  [id number]

## 2021-02-28 NOTE — DISCHARGE INSTRUCTIONS
Patient Education        Colecistectomía: Mary Jane Camp en el hogar  Cholecystectomy: What to Expect at Home  Molina recuperación  [facility], es normal sentirse débil y fatigado por varios días después de regresar al hogar. Es posible que tenga el abdomen hinchado. Si le olivares hecho khari cirugía laparoscópica, también podría sentir dolor en el hombro raven unas 24 horas. Es posible que tenga gases o necesite eructar mucho al principio, y a [address]. La diarrea suele desaparecer en el transcurso de 2 a 4 semanas, rachel podría durar más. El tiempo que tarde en recuperarse depende de si le olivares hecho khari cirugía laparoscópica o abierta. · En el rufina de [facility] laparoscópica, la mayoría de las personas pueden volver a trabajar o hacer molina rutina habitual en 1 a 2 semanas, rachel podría tardar más, según el tipo de trabajo que helen. · En el rufina de [place], es probable que tarde de 4 a 6 semanas en poder volver a molina rutina habitual.  Esta hoja de [employer] idea general del tiempo que le llevará recuperarse. No obstante, cada persona se recupera a un ritmo diferente. Siga los pasos que se mencionan a continuación para recuperarse lo más rápido posible. ¿Cómo puede cuidarse en el hogar? Actividad    · Descanse cuando se sienta fatigado. Dormir lo suficiente le ayudará a recuperarse.     · Intente caminar todos los días. Comience caminando un poco más de lo que caminó el día anterior. Aumente en forma gradual la distancia que camina. Caminar aumenta el flujo de Franky johnson y Tawanda Delgado a prevenir la neumonía y el estreñimiento.     · Evite levantar cualquier cosa que implique un esfuerzo raven unas 2 a 4 semanas.  [place] podría incluir un amna, bolsas de las compras y envases de [place] pesados, mochilas o maletines pesados, bolsas de arena para excremento de jayden o alimentos para perros, o khari aspiradora.     · Evite actividades vigorosas, meg [employer], kapil, [employer] pesas y hacer ejercicio aeróbico, hasta que alfaro médico lo apruebe.     · Puede ducharse entre 24 y 50 horas después de la [place], si alfaro médico lo [place]. Seque el diony (incisión) con toques suaves de toalla. No tome yinka de inmersión raven las primeras 2 semanas o hasta que alfaro médico lo apruebe.     · Puede conducir cuando ya no esté tomando analgésicos (medicamentos para el dolor) y pueda desplazar con rapidez alfaro pie del acelerador al freno. También debe estar en condiciones de poder permanecer sentado con comodidad raven un tiempo fernanda, aun si no piensa ir muy lejos. Podría quedar atascado en el tráfico.     · Para la cirugía laparoscópica, la mayoría de las personas pueden volver a trabajar o hacer alfaro rutina normal en 1 a 2 semanas, aunque podrían [employer]. En el rufina de [place], es probable que tarde de 4 a 6 semanas en poder volver a alfaro rutina habitual.     · Alfaro médico le indicará cuándo puede volver a tener relaciones sexuales. Dieta     · Coma cantidades más pequeñas varias veces al día en lugar de pocas veces y en grandes cantidades. Puede seguir khari dieta normal, rachel evite los alimentos grasosos por 1 mes aproximadamente. Entre los alimentos grasosos se [employer], la [place], [place] queso y muchos aperitivos. Si tiene [employer], coma alimentos suaves bajos en grasa, meg arroz sin condimentar, katie a la lauren, pan mira y yogur.     · Mary Ann abundantes líquidos (a menos que alfaro médico le indique lo contrario).     · Si tiene diarrea, evite los alimentos condimentados, los productos lácteos, los alimentos grasosos y el alcohol. También puede observar si la causa es algún alimento en particular y dejar de comerlo. Si la diarrea [employer] de 2 semanas, hable con alfaro médico.     · Podría notar que no evacua el intestino con regularidad david después de la [place]. [place] es común.  Trate de evitar el estreñimiento y de no hacer esfuerzos cuando evacua el intestino. Sería conveniente courtney un suplemento de [employer]. Si no ha evacuado el intestino después de un par de días, pregúntele a alfaro médico si puede courtney un laxante suave. Medicamentos    · Alfaro médico le dirá si puede volver a courtney chandrakant medicamentos y cuándo puede volver a hacerlo. También le dará indicaciones sobre cualquier medicamento nuevo que deba courtney usted.     · Si pee aspirina o cualquier otro medicamento que previene los coágulos de [place], pregúntele a alfaro médico si debería volver a tomarlo y en qué momento. Asegúrese de entender exactamente lo que alfaro médico quiere que helen.     · [place] los analgésicos exactamente meg le fueron indicados. ? Si el médico le recetó analgésicos, tómelos según las indicaciones. ? Si no está tomando analgésicos recetados, tome chino de venta ciaran, laisha meg acetaminofén (Tylenol), ibuprofeno (Advil, Motrin) o naproxeno (Aleve). Janeth y siga todas las instrucciones de la [place]. ? No tome dos o más analgésicos al [employer], a menos que el médico se lo haya indicado. Muchos analgésicos contienen acetaminofén, es decir, Tylenol. El exceso de Tylenol puede ser dañino.     · Si le parece que el analgésico le está produciendo revoltura del estómago:  ? [place] el medicamento después de las comidas (a menos que alfaro médico le indique lo contrario). ? [place] al médico un analgésico diferente.     · Si alfaro médico le recetó antibióticos, tómelos según las indicaciones. No deje de tomarlos por el hecho de sentirse mejor. Debe courtney todos los antibióticos hasta terminarlos. Cuidado de la incisión    · Si le olivares puesto tiras de cinta [employer] incisión, o diony, déjeselas puestas raven khari semana o hasta que se caigan por sí solas.     · Después de 24 a 48 horas, lave la karlos a diario con [place] tibia y séquela con toques suaves de toalla.     · Es posible que tenga grapas para mantener el diony cerrado.  [place] secas hasta que alfaro [address] [facility] [address]. [place] es por lo general en 7 a 10 días.     · Mantenga la karlos limpia y seca. Puede cubrirla con khari venda de gasa si supura o roza contra la ropa. Cambie la venda todos los luis. Hielo     · Para reducir la hinchazón y el dolor, colóquese hielo o khari compresa fría sobre el abdomen raven 10 a 20 minutos cada vez. Rebekah esto cada 1 a 2 horas. Póngase un paño cuadra entre el hielo y la piel. La atención de seguimiento es khari parte clave de alfaro tratamiento y seguridad. Asegúrese de hacer y acudir a todas las citas, y llame a alfaro médico si está teniendo problemas. También es khari buena idea saber los resultados de chandrakant exámenes y mantener khari lista de los medicamentos que pee. ¿Cuándo debe pedir ayuda? Llame al 911 en cualquier momento que considere que necesita atención de [place]. Por ejemplo, llame si:    · Se desmayó (perdió el conocimiento).     · Tiene serias dificultades para respirar.     · Tiene dolor repentino en el pecho y falta de [place], o tose philip. Llame a alfaro médico ahora mismo o busque atención médica inmediata si:    · Siente el estómago revuelto y no puede beber líquidos.     · Tiene dolor que no mejora después de courtney analgésicos.     · Tiene señales de infección, tales meg:  ? Aumento del dolor, la hinchazón, el enrojecimiento o la temperatura. ? Vetas rojizas que salen de la incisión. ? Pus que supura de la incisión. ? Ganglios linfáticos inflamados en el dominik, las axilas o la svetlana. ? Laurie Boss.     · La orina es de color amarronado oscuro o las heces son de color andriy o del color de la arcilla.     · La piel o la parte ilia de los ojos se le ponen amarillentas.     · La venda colocada sobre la incisión se empapa con philip de color helton vivo.     · Tiene señales de un coágulo de philip, tales meg:  ? Dolor en la pantorrilla, el muslo, la svetlana o detrás de la rodilla. ?  Enrojecimiento e hinchazón en la pierna o la svetlana.     · Tiene problemas para orinar o evacuar el intestino, en especial si tiene dolor leve o hinchazón en la parte baja del abdomen. Preste especial atención a cualquier cambio en alfaro yvette y asegúrese de comunicarse con alfaro médico si:    · Le olivares hecho khari Kathleen Band y el dolor en el hombro dura más de 24 horas.     · No evacua el intestino después de courtney un laxante. ¿Dónde puede encontrar más información en inglés? Jose Christopher a [email or web address].gray.com/  Jose David F357 en la búsqueda para aprender Zygmunt Ket de \"Colecistectomía: Qué esperar en el hogar. \"  Revisado: 15 dolores, 2020               Versión del contenido: 12.6  © 3727-3746 Free & Clear, [employer]. Las instrucciones de cuidado fueron adaptadas bajo licencia por [employer] (which disclaims liability or warranty for this information). Si usted tiene [place] afección médica o sobre estas instrucciones, siempre pregunte a alfaro profesional de yvette. Free & Clear, [employer] niega toda garantía o responsabilidad por alfaro uso de esta información.

## 2021-03-16 ENCOUNTER — OFFICE VISIT (OUTPATIENT)
Dept: SURGERY | Age: 31
End: 2021-03-16
Payer: SUBSIDIZED

## 2021-03-16 VITALS
OXYGEN SATURATION: 99 % | WEIGHT: 165 LBS | TEMPERATURE: 97.9 F | BODY MASS INDEX: 29.23 KG/M2 | DIASTOLIC BLOOD PRESSURE: 70 MMHG | HEIGHT: 63 IN | HEART RATE: 68 BPM | SYSTOLIC BLOOD PRESSURE: 100 MMHG | RESPIRATION RATE: 16 BRPM

## 2021-03-16 DIAGNOSIS — K81.0 ACUTE CHOLECYSTITIS: Primary | ICD-10-CM

## 2021-03-16 PROCEDURE — 99024 POSTOP FOLLOW-UP VISIT: CPT | Performed by: SURGERY

## 2021-03-16 NOTE — PROGRESS NOTES
[facility]      Clinic Note - Follow up    Subjective     Mahi Howell returns for scheduled follow up today. She is post laparoscopic cholecystectomy with cholangiogram.  She is doing well. She has minimal pain. She is resume normal activities. She does have some constipation, and has taken laxatives for that. She is no longer taking narcotics for pain. Objective     There were no vitals taken for this visit. PE  GEN - Awake, alert, communicating appropriately. NAD  Pulm - CTAB  CV - RRR  Abd - soft, NT, ND. Incisions intact with no evidence of infection. Ext - warm, well perfused, no edema. All other systems negative unless indicated above. Labs  None      Imaging  None      Assessment     Falguni Engel Cherri Anderson is a 27 y. o.yr old female post laparoscopic cholecystectomy. She is doing well. Plan     We discussed over-the-counter options for her constipation. This should improve since she is no longer taking narcotics. She no longer has any restrictions for diet or lifting. She may follow-up with me on an as-needed basis. 30 mins of time was spent with the patient of which > 50% of the time involved face-to-face counseling of the patient regarding the proposed treatment plan. Leslie Bell MD  3/16/2021    CC:  Shawna Andrew DO

## 2021-03-16 NOTE — PROGRESS NOTES
1. Have you been to the ER, urgent care clinic since your last visit? Hospitalized since your last visit? No    2. Have you seen or consulted any other health care providers outside of the [address] [place] [address] since your last visit? Include any pap smears or colon screening. Ibeth Ahn with  Language Service Dept at [employer] assisted with translation at visit today via phone line.

## 2022-03-18 PROBLEM — D69.6 THROMBOCYTOPENIA AFFECTING PREGNANCY (HCC): Status: ACTIVE | Noted: 2020-02-28

## 2022-03-18 PROBLEM — O99.119 THROMBOCYTOPENIA AFFECTING PREGNANCY (HCC): Status: ACTIVE | Noted: 2020-02-28

## 2022-03-18 PROBLEM — K80.20 GALLSTONES: Status: ACTIVE | Noted: 2020-02-28

## 2022-03-19 PROBLEM — Z86.59 HISTORY OF DEPRESSION: Status: ACTIVE | Noted: 2020-02-28

## 2022-03-19 PROBLEM — O99.210 OBESITY IN PREGNANCY: Status: ACTIVE | Noted: 2020-02-28

## 2022-03-19 PROBLEM — K81.0 ACUTE CHOLECYSTITIS: Status: ACTIVE | Noted: 2021-02-27

## 2022-03-20 PROBLEM — Z28.39 RUBELLA NON-IMMUNE STATUS, ANTEPARTUM: Status: ACTIVE | Noted: 2020-02-28

## 2022-03-20 PROBLEM — O09.899 RUBELLA NON-IMMUNE STATUS, ANTEPARTUM: Status: ACTIVE | Noted: 2020-02-28

## 2023-05-20 RX ORDER — NORGESTIMATE AND ETHINYL ESTRADIOL 0.25-0.035
1 KIT ORAL DAILY
COMMUNITY
Start: 2020-06-26

## (undated) DEVICE — STRAP,POSITIONING,KNEE/BODY,FOAM,4X60": Brand: MEDLINE

## (undated) DEVICE — TISSUE RETRIEVAL SYSTEM: Brand: INZII RETRIEVAL SYSTEM

## (undated) DEVICE — PREP SKN CHLRAPRP APL 26ML STR --

## (undated) DEVICE — NEEDLE HYPO 22GA L1.5IN BLK S STL HUB POLYPR SHLD REG BVL

## (undated) DEVICE — SURGICAL PROCEDURE KIT GEN LAPAROSCOPY LF

## (undated) DEVICE — DERMABOND SKIN ADH 0.7ML -- DERMABOND ADVANCED 12/BX

## (undated) DEVICE — INSUFFLATION NEEDLE: Brand: SURGINEEDLE

## (undated) DEVICE — TROCAR: Brand: KII® OPTICAL ACCESS SYSTEM

## (undated) DEVICE — TROCAR: Brand: KII® SLEEVE

## (undated) DEVICE — LAPAROSCOPIC TROCAR SLEEVE/SINGLE USE: Brand: KII® OPTICAL ACCESS SYSTEM

## (undated) DEVICE — LAPAROSCOPIC WIRE L-HOOK ELECTRODE COATED: Brand: CLEANCOAT

## (undated) DEVICE — SET CHOLANGIOGRAPHY 4FR L60CM W/ ARW KARLAN BLLN CATH CRV

## (undated) DEVICE — SOL IRRIGATION INJ NACL 0.9% 500ML BTL

## (undated) DEVICE — STERILE POLYISOPRENE POWDER-FREE SURGICAL GLOVES: Brand: PROTEXIS

## (undated) DEVICE — Device

## (undated) DEVICE — DEVICE TRNSF SPIK STL 2008S] MICROTEK MEDICAL INC]

## (undated) DEVICE — APPLIER CLP M L L11.4IN DIA10MM ENDOSCP ROT MULT FOR LIG

## (undated) DEVICE — COVER LT HNDL PLAS RIG 1 PER PK

## (undated) DEVICE — CLICKLINE SCISSORS INSERT: Brand: CLICKLINE

## (undated) DEVICE — DRAPE,REIN 53X77,STERILE: Brand: MEDLINE

## (undated) DEVICE — REM POLYHESIVE ADULT PATIENT RETURN ELECTRODE: Brand: VALLEYLAB

## (undated) DEVICE — INFECTION CONTROL KIT SYS

## (undated) DEVICE — CANISTER, RIGID, 3000CC: Brand: MEDLINE INDUSTRIES, INC.

## (undated) DEVICE — SUTURE VCRL SZ 4-0 L27IN ABSRB UD L19MM PS-2 3/8 CIR PRIM J426H

## (undated) DEVICE — SUTURE SZ 0 27IN 5/8 CIR UR-6  TAPER PT VIOLET ABSRB VICRYL J603H